# Patient Record
Sex: FEMALE | Race: WHITE | NOT HISPANIC OR LATINO | Employment: FULL TIME | URBAN - NONMETROPOLITAN AREA
[De-identification: names, ages, dates, MRNs, and addresses within clinical notes are randomized per-mention and may not be internally consistent; named-entity substitution may affect disease eponyms.]

---

## 2018-04-18 LAB — CRC RECOMMENDATION EXT: NORMAL

## 2018-11-16 ENCOUNTER — HISTORICAL (OUTPATIENT)
Dept: ADMINISTRATIVE | Facility: HOSPITAL | Age: 43
End: 2018-11-16

## 2020-03-13 ENCOUNTER — HISTORICAL (OUTPATIENT)
Dept: ADMINISTRATIVE | Facility: HOSPITAL | Age: 45
End: 2020-03-13

## 2020-03-23 ENCOUNTER — HISTORICAL (OUTPATIENT)
Dept: ADMINISTRATIVE | Facility: HOSPITAL | Age: 45
End: 2020-03-23

## 2020-03-23 LAB
ALBUMIN SERPL-MCNC: 4.2 G/DL (ref 3.8–4.8)
ALBUMIN/GLOB SERPL: 1.4 {RATIO} (ref 1.2–2.2)
ALP SERPL-CCNC: 85 IU/L (ref 39–117)
ALT SERPL-CCNC: 38 IU/L (ref 0–32)
AST SERPL-CCNC: 37 IU/L (ref 0–40)
BASOPHILS # BLD AUTO: 0.1 X10E3/UL (ref 0–0.2)
BASOPHILS NFR BLD AUTO: 1 %
BILIRUB SERPL-MCNC: 0.4 MG/DL (ref 0–1.2)
BUN SERPL-MCNC: 19 MG/DL (ref 6–24)
CALCIUM SERPL-MCNC: 9.9 MG/DL (ref 8.7–10.2)
CHLORIDE SERPL-SCNC: 101 MMOL/L (ref 96–106)
CO2 SERPL-SCNC: 19 MMOL/L (ref 20–29)
CREAT SERPL-MCNC: 0.68 MG/DL (ref 0.57–1)
CREAT/UREA NIT SERPL: 28 (ref 9–23)
EOSINOPHIL # BLD AUTO: 0.3 X10E3/UL (ref 0–0.4)
EOSINOPHIL NFR BLD AUTO: 1 %
ERYTHROCYTE [DISTWIDTH] IN BLOOD BY AUTOMATED COUNT: 13.2 % (ref 11.7–15.4)
GLOBULIN SER-MCNC: 3.1 G/DL (ref 1.5–4.5)
GLUCOSE SERPL-MCNC: 130 MG/DL (ref 65–99)
HBA1C MFR BLD: 7.1 % (ref 4.8–5.6)
HCT VFR BLD AUTO: 46.6 % (ref 34–46.6)
HGB BLD-MCNC: 15.2 G/DL (ref 11.1–15.9)
LYMPHOCYTES # BLD AUTO: 5.3 X10E3/UL (ref 0.7–3.1)
LYMPHOCYTES NFR BLD AUTO: 28 %
MAGNESIUM SERPL-MCNC: 2.3 MG/DL (ref 1.6–2.3)
MCH RBC QN AUTO: 27.9 PG (ref 26.6–33)
MCHC RBC AUTO-ENTMCNC: 32.6 G/DL (ref 31.5–35.7)
MCV RBC AUTO: 86 FL (ref 79–97)
MONOCYTES # BLD AUTO: 1.7 X10E3/UL (ref 0.1–0.9)
MONOCYTES NFR BLD AUTO: 9 %
NEUTROPHILS # BLD AUTO: 11.5 X10E3/UL (ref 1.4–7)
NEUTROPHILS NFR BLD AUTO: 61 %
PLATELET # BLD AUTO: 362 X10E3/UL (ref 150–450)
POTASSIUM SERPL-SCNC: 4.6 MMOL/L (ref 3.5–5.2)
PROT SERPL-MCNC: 7.3 G/DL (ref 6–8.5)
RBC # BLD AUTO: 5.45 X10(6)/MCL (ref 3.77–5.28)
SODIUM SERPL-SCNC: 135 MMOL/L (ref 134–144)
TSH SERPL-ACNC: 2.39 MIU/ML (ref 0.45–4.5)
WBC # SPEC AUTO: 18.9 X10E3/UL (ref 3.4–10.8)

## 2020-04-10 ENCOUNTER — HISTORICAL (OUTPATIENT)
Dept: ADMINISTRATIVE | Facility: HOSPITAL | Age: 45
End: 2020-04-10

## 2020-04-14 ENCOUNTER — HISTORICAL (OUTPATIENT)
Dept: ADMINISTRATIVE | Facility: HOSPITAL | Age: 45
End: 2020-04-14

## 2020-09-24 ENCOUNTER — HISTORICAL (OUTPATIENT)
Dept: ADMINISTRATIVE | Facility: HOSPITAL | Age: 45
End: 2020-09-24

## 2020-09-24 LAB
ALBUMIN SERPL-MCNC: 4.2 G/DL (ref 3.8–4.8)
ALBUMIN/GLOB SERPL: 1.3 {RATIO} (ref 1.2–2.2)
ALP SERPL-CCNC: 101 IU/L (ref 39–117)
ALT SERPL-CCNC: 34 IU/L (ref 0–32)
AST SERPL-CCNC: 52 IU/L (ref 0–40)
BASOPHILS # BLD AUTO: 0.1 X10E3/UL (ref 0–0.2)
BASOPHILS NFR BLD AUTO: 1 %
BILIRUB SERPL-MCNC: 0.3 MG/DL (ref 0–1.2)
BUN SERPL-MCNC: 11 MG/DL (ref 6–24)
CALCIUM SERPL-MCNC: 9.4 MG/DL (ref 8.7–10.2)
CHLORIDE SERPL-SCNC: 105 MMOL/L (ref 96–106)
CHOLEST SERPL-MCNC: 145 MG/DL (ref 100–199)
CHOLEST/HDLC SERPL: 3.3 RATIO (ref 0–4.4)
CO2 SERPL-SCNC: 18 MMOL/L (ref 20–29)
CREAT SERPL-MCNC: 0.6 MG/DL (ref 0.57–1)
CREAT/UREA NIT SERPL: 18 (ref 9–23)
DEPRECATED CALCIDIOL+CALCIFEROL SERPL-MC: 48.6 NG/ML (ref 30–100)
EOSINOPHIL # BLD AUTO: 0.6 X10E3/UL (ref 0–0.4)
EOSINOPHIL NFR BLD AUTO: 5 %
ERYTHROCYTE [DISTWIDTH] IN BLOOD BY AUTOMATED COUNT: 13.2 % (ref 11.7–15.4)
GLOBULIN SER-MCNC: 3.2 G/DL (ref 1.5–4.5)
GLUCOSE SERPL-MCNC: 109 MG/DL (ref 65–99)
HBA1C MFR BLD: 6.7 % (ref 4.8–5.6)
HCT VFR BLD AUTO: 43.1 % (ref 34–46.6)
HDLC SERPL-MCNC: 44 MG/DL
HGB BLD-MCNC: 13.6 G/DL (ref 11.1–15.9)
LDLC SERPL CALC-MCNC: 75 MG/DL (ref 0–99)
LYMPHOCYTES # BLD AUTO: 2.6 X10E3/UL (ref 0.7–3.1)
LYMPHOCYTES NFR BLD AUTO: 23 %
MCH RBC QN AUTO: 27.3 PG (ref 26.6–33)
MCHC RBC AUTO-ENTMCNC: 31.6 G/DL (ref 31.5–35.7)
MCV RBC AUTO: 86 FL (ref 79–97)
MONOCYTES # BLD AUTO: 1 X10E3/UL (ref 0.1–0.9)
MONOCYTES NFR BLD AUTO: 9 %
NEUTROPHILS # BLD AUTO: 7.1 X10E3/UL (ref 1.4–7)
NEUTROPHILS NFR BLD AUTO: 62 %
PLATELET # BLD AUTO: 415 X10E3/UL (ref 150–450)
POTASSIUM SERPL-SCNC: 4.5 MMOL/L (ref 3.5–5.2)
PROT SERPL-MCNC: 7.4 G/DL (ref 6–8.5)
RBC # BLD AUTO: 4.99 X10(6)/MCL (ref 3.77–5.28)
SODIUM SERPL-SCNC: 139 MMOL/L (ref 134–144)
TRIGL SERPL-MCNC: 147 MG/DL (ref 0–149)
TSH SERPL-ACNC: 1.59 MIU/ML (ref 0.45–4.5)
VLDLC SERPL CALC-MCNC: 26 MG/DL (ref 5–40)
WBC # SPEC AUTO: 11.5 X10E3/UL (ref 3.4–10.8)

## 2020-12-02 LAB — RAPID GROUP A STREP (OHS): POSITIVE

## 2021-03-04 LAB
LEFT EYE DM RETINOPATHY: NEGATIVE
RIGHT EYE DM RETINOPATHY: NEGATIVE

## 2021-07-12 ENCOUNTER — HISTORICAL (OUTPATIENT)
Dept: ADMINISTRATIVE | Facility: HOSPITAL | Age: 46
End: 2021-07-12

## 2021-07-12 LAB
ALBUMIN SERPL-MCNC: 3.9 G/DL (ref 3.8–4.8)
ALBUMIN/GLOB SERPL: 1.2 {RATIO} (ref 1.2–2.2)
ALP SERPL-CCNC: 95 IU/L (ref 48–121)
ALT SERPL-CCNC: 29 IU/L (ref 0–32)
AST SERPL-CCNC: 31 IU/L (ref 0–40)
BASOPHILS # BLD AUTO: 0.1 X10E3/UL (ref 0–0.2)
BASOPHILS NFR BLD AUTO: 1 %
BILIRUB SERPL-MCNC: <0.2 MG/DL (ref 0–1.2)
BUN SERPL-MCNC: 16 MG/DL (ref 6–24)
CALCIUM SERPL-MCNC: 9.5 MG/DL (ref 8.7–10.2)
CHLORIDE SERPL-SCNC: 100 MMOL/L (ref 96–106)
CHOLEST SERPL-MCNC: 161 MG/DL (ref 100–199)
CHOLEST/HDLC SERPL: 3.7 RATIO (ref 0–4.4)
CO2 SERPL-SCNC: 20 MMOL/L (ref 20–29)
CREAT SERPL-MCNC: 0.6 MG/DL (ref 0.57–1)
CREAT/UREA NIT SERPL: 27 (ref 9–23)
DEPRECATED CALCIDIOL+CALCIFEROL SERPL-MC: 57.9 NG/ML (ref 30–100)
EOSINOPHIL # BLD AUTO: 0.7 X10E3/UL (ref 0–0.4)
EOSINOPHIL NFR BLD AUTO: 5 %
ERYTHROCYTE [DISTWIDTH] IN BLOOD BY AUTOMATED COUNT: 13.5 % (ref 11.7–15.4)
GLOBULIN SER-MCNC: 3.3 G/DL (ref 1.5–4.5)
GLUCOSE SERPL-MCNC: 133 MG/DL (ref 65–99)
HBA1C MFR BLD: 6.3 % (ref 4.8–5.6)
HCT VFR BLD AUTO: 43 % (ref 34–46.6)
HDLC SERPL-MCNC: 44 MG/DL
HGB BLD-MCNC: 13.8 G/DL (ref 11.1–15.9)
LDLC SERPL CALC-MCNC: 80 MG/DL (ref 0–99)
LYMPHOCYTES # BLD AUTO: 3.6 X10E3/UL (ref 0.7–3.1)
LYMPHOCYTES NFR BLD AUTO: 25 %
MCH RBC QN AUTO: 29.6 PG (ref 26.6–33)
MCHC RBC AUTO-ENTMCNC: 32.1 G/DL (ref 31.5–35.7)
MCV RBC AUTO: 92 FL (ref 79–97)
MONOCYTES # BLD AUTO: 1.1 X10E3/UL (ref 0.1–0.9)
MONOCYTES NFR BLD AUTO: 7 %
NEUTROPHILS # BLD AUTO: 9 X10E3/UL (ref 1.4–7)
NEUTROPHILS NFR BLD AUTO: 62 %
PLATELET # BLD AUTO: 377 X10E3/UL (ref 150–450)
POTASSIUM SERPL-SCNC: 4.6 MMOL/L (ref 3.5–5.2)
PROT SERPL-MCNC: 7.2 G/DL (ref 6–8.5)
RBC # BLD AUTO: 4.66 X10(6)/MCL (ref 3.77–5.28)
SODIUM SERPL-SCNC: 139 MMOL/L (ref 134–144)
TRIGL SERPL-MCNC: 224 MG/DL (ref 0–149)
TSH SERPL-ACNC: 2.43 MIU/ML (ref 0.45–4.5)
VLDLC SERPL CALC-MCNC: 37 MG/DL (ref 5–40)
WBC # SPEC AUTO: 14.5 X10E3/UL (ref 3.4–10.8)

## 2021-08-05 LAB
BILIRUB SERPL-MCNC: NEGATIVE MG/DL
BLOOD URINE, POC: NORMAL
CLARITY, POC UA: CLEAR
COLOR, POC UA: YELLOW
GLUCOSE UR QL STRIP: NEGATIVE
KETONES UR QL STRIP: NEGATIVE
LEUKOCYTE EST, POC UA: NORMAL
NITRITE, POC UA: NEGATIVE
PH, POC UA: 5.5
PROTEIN, POC: NEGATIVE
SPECIFIC GRAVITY, POC UA: 1.02
UROBILINOGEN, POC UA: NORMAL

## 2021-08-16 LAB — PAP RECOMMENDATION EXT: NORMAL

## 2021-11-06 ENCOUNTER — HISTORICAL (OUTPATIENT)
Dept: ADMINISTRATIVE | Facility: HOSPITAL | Age: 46
End: 2021-11-06

## 2021-11-30 ENCOUNTER — HISTORICAL (OUTPATIENT)
Dept: ADMINISTRATIVE | Facility: HOSPITAL | Age: 46
End: 2021-11-30

## 2021-11-30 LAB
ALBUMIN SERPL-MCNC: 4.1 G/DL (ref 3.8–4.8)
ALBUMIN/GLOB SERPL: 1.2 {RATIO} (ref 1.2–2.2)
ALP SERPL-CCNC: 94 IU/L (ref 44–121)
ALT SERPL-CCNC: 53 IU/L (ref 0–32)
AST SERPL-CCNC: 73 IU/L (ref 0–40)
BASOPHILS # BLD AUTO: 0.1 X10E3/UL (ref 0–0.2)
BASOPHILS NFR BLD AUTO: 1 %
BILIRUB SERPL-MCNC: 0.3 MG/DL (ref 0–1.2)
BUN SERPL-MCNC: 20 MG/DL (ref 6–24)
CALCIUM SERPL-MCNC: 9.7 MG/DL (ref 8.7–10.2)
CHLORIDE SERPL-SCNC: 102 MMOL/L (ref 96–106)
CHOLEST SERPL-MCNC: 157 MG/DL (ref 100–199)
CHOLEST/HDLC SERPL: 3.6 RATIO (ref 0–4.4)
CO2 SERPL-SCNC: 18 MMOL/L (ref 20–29)
CREAT SERPL-MCNC: 0.57 MG/DL (ref 0.57–1)
CREAT/UREA NIT SERPL: 35 (ref 9–23)
DEPRECATED CALCIDIOL+CALCIFEROL SERPL-MC: 93.6 NG/ML (ref 30–100)
EOSINOPHIL # BLD AUTO: 0.4 X10E3/UL (ref 0–0.4)
EOSINOPHIL NFR BLD AUTO: 4 %
ERYTHROCYTE [DISTWIDTH] IN BLOOD BY AUTOMATED COUNT: 13 % (ref 11.7–15.4)
GLOBULIN SER-MCNC: 3.3 G/DL (ref 1.5–4.5)
GLUCOSE SERPL-MCNC: 115 MG/DL (ref 65–99)
HBA1C MFR BLD: 6.6 % (ref 4.8–5.6)
HCT VFR BLD AUTO: 41.1 % (ref 34–46.6)
HDLC SERPL-MCNC: 44 MG/DL
HGB BLD-MCNC: 13.9 G/DL (ref 11.1–15.9)
LDLC SERPL CALC-MCNC: 73 MG/DL (ref 0–99)
LYMPHOCYTES # BLD AUTO: 3.2 X10E3/UL (ref 0.7–3.1)
LYMPHOCYTES NFR BLD AUTO: 27 %
MCH RBC QN AUTO: 29.8 PG (ref 26.6–33)
MCHC RBC AUTO-ENTMCNC: 33.8 G/DL (ref 31.5–35.7)
MCV RBC AUTO: 88 FL (ref 79–97)
MONOCYTES # BLD AUTO: 0.9 X10E3/UL (ref 0.1–0.9)
MONOCYTES NFR BLD AUTO: 7 %
NEUTROPHILS # BLD AUTO: 7.3 X10E3/UL (ref 1.4–7)
NEUTROPHILS NFR BLD AUTO: 61 %
PLATELET # BLD AUTO: 357 X10E3/UL (ref 150–450)
POTASSIUM SERPL-SCNC: 4.6 MMOL/L (ref 3.5–5.2)
PROT SERPL-MCNC: 7.4 G/DL (ref 6–8.5)
RBC # BLD AUTO: 4.67 X10(6)/MCL (ref 3.77–5.28)
SODIUM SERPL-SCNC: 141 MMOL/L (ref 134–144)
TRIGL SERPL-MCNC: 244 MG/DL (ref 0–149)
TSH SERPL-ACNC: 2.53 MIU/ML (ref 0.45–4.5)
VLDLC SERPL CALC-MCNC: 40 MG/DL (ref 5–40)
WBC # SPEC AUTO: 11.9 X10E3/UL (ref 3.4–10.8)

## 2021-12-09 ENCOUNTER — HISTORICAL (OUTPATIENT)
Dept: ADMINISTRATIVE | Facility: HOSPITAL | Age: 46
End: 2021-12-09

## 2021-12-14 ENCOUNTER — HISTORICAL (OUTPATIENT)
Dept: ADMINISTRATIVE | Facility: HOSPITAL | Age: 46
End: 2021-12-14

## 2021-12-15 ENCOUNTER — HISTORICAL (OUTPATIENT)
Dept: ADMINISTRATIVE | Facility: HOSPITAL | Age: 46
End: 2021-12-15

## 2022-02-04 ENCOUNTER — HISTORICAL (OUTPATIENT)
Dept: CARDIOLOGY | Facility: HOSPITAL | Age: 47
End: 2022-02-04

## 2022-02-04 LAB — CBG: 139 (ref 70–115)

## 2022-04-11 ENCOUNTER — HISTORICAL (OUTPATIENT)
Dept: ADMINISTRATIVE | Facility: HOSPITAL | Age: 47
End: 2022-04-11

## 2022-04-28 VITALS
BODY MASS INDEX: 40.81 KG/M2 | SYSTOLIC BLOOD PRESSURE: 124 MMHG | DIASTOLIC BLOOD PRESSURE: 70 MMHG | WEIGHT: 260 LBS | HEIGHT: 67 IN | OXYGEN SATURATION: 97 %

## 2022-05-03 LAB
ALBUMIN/GLOB SERPL: 1.2 {RATIO} (ref 1.2–2.2)
CHOLEST SERPL-MCNC: 157 MG/DL (ref 100–199)
CHOLEST/HDLC SERPL: 3.9 {RATIO} (ref 0–4.4)
HBA1C MFR BLD: 7.2 % (ref 4.8–5.6)
HDLC SERPL-MCNC: 40 MG/DL
LDLC SERPL CALC-MCNC: 78 MG/DL (ref 0–99)
TRIGL SERPL-MCNC: 237 MG/DL (ref 0–149)
VLDLC SERPL CALC-MCNC: 39 MG/DL (ref 5–40)

## 2022-05-16 ENCOUNTER — HISTORICAL (OUTPATIENT)
Dept: ADMINISTRATIVE | Facility: HOSPITAL | Age: 47
End: 2022-05-16

## 2022-09-22 ENCOUNTER — HISTORICAL (OUTPATIENT)
Dept: ADMINISTRATIVE | Facility: HOSPITAL | Age: 47
End: 2022-09-22
Payer: COMMERCIAL

## 2022-10-12 ENCOUNTER — DOCUMENTATION ONLY (OUTPATIENT)
Dept: PRIMARY CARE CLINIC | Facility: CLINIC | Age: 47
End: 2022-10-12
Payer: COMMERCIAL

## 2022-12-07 LAB
HUMAN PAPILLOMAVIRUS (HPV): NORMAL
PAP RECOMMENDATION EXT: NORMAL
PAP SMEAR: NORMAL

## 2023-01-30 ENCOUNTER — OFFICE VISIT (OUTPATIENT)
Dept: FAMILY MEDICINE | Facility: CLINIC | Age: 48
End: 2023-01-30
Payer: COMMERCIAL

## 2023-01-30 ENCOUNTER — TELEPHONE (OUTPATIENT)
Dept: FAMILY MEDICINE | Facility: CLINIC | Age: 48
End: 2023-01-30

## 2023-01-30 VITALS
DIASTOLIC BLOOD PRESSURE: 68 MMHG | WEIGHT: 245 LBS | HEIGHT: 67 IN | OXYGEN SATURATION: 98 % | BODY MASS INDEX: 38.45 KG/M2 | SYSTOLIC BLOOD PRESSURE: 118 MMHG | TEMPERATURE: 98 F | RESPIRATION RATE: 20 BRPM | HEART RATE: 82 BPM

## 2023-01-30 DIAGNOSIS — R92.8 ABNORMAL MAMMOGRAM OF BOTH BREASTS: ICD-10-CM

## 2023-01-30 DIAGNOSIS — Z23 IMMUNIZATION DUE: ICD-10-CM

## 2023-01-30 DIAGNOSIS — E11.9 DIABETES MELLITUS WITHOUT COMPLICATION: Primary | ICD-10-CM

## 2023-01-30 DIAGNOSIS — L70.0 ACNE VULGARIS: ICD-10-CM

## 2023-01-30 DIAGNOSIS — Z79.899 ENCOUNTER FOR LONG-TERM (CURRENT) USE OF MEDICATIONS: ICD-10-CM

## 2023-01-30 PROBLEM — Z12.39 ENCOUNTER FOR SCREENING BREAST EXAMINATION: Status: ACTIVE | Noted: 2023-01-30

## 2023-01-30 PROCEDURE — 3078F PR MOST RECENT DIASTOLIC BLOOD PRESSURE < 80 MM HG: ICD-10-PCS | Mod: CPTII,,, | Performed by: NURSE PRACTITIONER

## 2023-01-30 PROCEDURE — 1160F RVW MEDS BY RX/DR IN RCRD: CPT | Mod: CPTII,,, | Performed by: NURSE PRACTITIONER

## 2023-01-30 PROCEDURE — 4010F ACE/ARB THERAPY RXD/TAKEN: CPT | Mod: CPTII,,, | Performed by: NURSE PRACTITIONER

## 2023-01-30 PROCEDURE — 1160F PR REVIEW ALL MEDS BY PRESCRIBER/CLIN PHARMACIST DOCUMENTED: ICD-10-PCS | Mod: CPTII,,, | Performed by: NURSE PRACTITIONER

## 2023-01-30 PROCEDURE — 4010F PR ACE/ARB THEARPY RXD/TAKEN: ICD-10-PCS | Mod: CPTII,,, | Performed by: NURSE PRACTITIONER

## 2023-01-30 PROCEDURE — 3078F DIAST BP <80 MM HG: CPT | Mod: CPTII,,, | Performed by: NURSE PRACTITIONER

## 2023-01-30 PROCEDURE — 90471 IMMUNIZATION ADMIN: CPT | Mod: ,,, | Performed by: NURSE PRACTITIONER

## 2023-01-30 PROCEDURE — 3074F SYST BP LT 130 MM HG: CPT | Mod: CPTII,,, | Performed by: NURSE PRACTITIONER

## 2023-01-30 PROCEDURE — 1159F PR MEDICATION LIST DOCUMENTED IN MEDICAL RECORD: ICD-10-PCS | Mod: CPTII,,, | Performed by: NURSE PRACTITIONER

## 2023-01-30 PROCEDURE — 3008F PR BODY MASS INDEX (BMI) DOCUMENTED: ICD-10-PCS | Mod: CPTII,,, | Performed by: NURSE PRACTITIONER

## 2023-01-30 PROCEDURE — 90471 TDAP VACCINE GREATER THAN OR EQUAL TO 7YO IM: ICD-10-PCS | Mod: ,,, | Performed by: NURSE PRACTITIONER

## 2023-01-30 PROCEDURE — 1159F MED LIST DOCD IN RCRD: CPT | Mod: CPTII,,, | Performed by: NURSE PRACTITIONER

## 2023-01-30 PROCEDURE — 3074F PR MOST RECENT SYSTOLIC BLOOD PRESSURE < 130 MM HG: ICD-10-PCS | Mod: CPTII,,, | Performed by: NURSE PRACTITIONER

## 2023-01-30 PROCEDURE — 90715 TDAP VACCINE GREATER THAN OR EQUAL TO 7YO IM: ICD-10-PCS | Mod: ,,, | Performed by: NURSE PRACTITIONER

## 2023-01-30 PROCEDURE — 99214 OFFICE O/P EST MOD 30 MIN: CPT | Mod: 25,,, | Performed by: NURSE PRACTITIONER

## 2023-01-30 PROCEDURE — 90715 TDAP VACCINE 7 YRS/> IM: CPT | Mod: ,,, | Performed by: NURSE PRACTITIONER

## 2023-01-30 PROCEDURE — 99214 PR OFFICE/OUTPT VISIT, EST, LEVL IV, 30-39 MIN: ICD-10-PCS | Mod: 25,,, | Performed by: NURSE PRACTITIONER

## 2023-01-30 PROCEDURE — 3008F BODY MASS INDEX DOCD: CPT | Mod: CPTII,,, | Performed by: NURSE PRACTITIONER

## 2023-01-30 RX ORDER — SUVOREXANT 20 MG/1
1 TABLET, FILM COATED ORAL NIGHTLY
COMMUNITY
Start: 2023-01-26

## 2023-01-30 RX ORDER — CYANOCOBALAMIN 1000 UG/ML
1000 INJECTION, SOLUTION INTRAMUSCULAR; SUBCUTANEOUS
COMMUNITY
Start: 2022-02-04 | End: 2023-04-03 | Stop reason: SDUPTHER

## 2023-01-30 RX ORDER — ALBUTEROL SULFATE 90 UG/1
2 AEROSOL, METERED RESPIRATORY (INHALATION) 4 TIMES DAILY
COMMUNITY
Start: 2022-12-28 | End: 2023-03-28 | Stop reason: SDUPTHER

## 2023-01-30 RX ORDER — FLUTICASONE PROPIONATE 50 MCG
1 SPRAY, SUSPENSION (ML) NASAL 2 TIMES DAILY
COMMUNITY
Start: 2022-11-16 | End: 2023-03-28 | Stop reason: SDUPTHER

## 2023-01-30 RX ORDER — DOXEPIN HYDROCHLORIDE 100 MG/1
100 CAPSULE ORAL
COMMUNITY
Start: 2022-09-09

## 2023-01-30 RX ORDER — METFORMIN HYDROCHLORIDE 500 MG/1
2000 TABLET, EXTENDED RELEASE ORAL
COMMUNITY
Start: 2022-11-16 | End: 2023-01-30 | Stop reason: SDUPTHER

## 2023-01-30 RX ORDER — BUPROPION HYDROCHLORIDE 300 MG/1
300 TABLET ORAL EVERY MORNING
COMMUNITY
Start: 2023-01-26

## 2023-01-30 RX ORDER — ASPIRIN 325 MG
50000 TABLET, DELAYED RELEASE (ENTERIC COATED) ORAL
COMMUNITY
Start: 2022-02-04 | End: 2023-04-03 | Stop reason: SDUPTHER

## 2023-01-30 RX ORDER — TRETINOIN 0.5 MG/G
CREAM TOPICAL NIGHTLY
Qty: 45 G | Refills: 1 | Status: SHIPPED | OUTPATIENT
Start: 2023-01-30 | End: 2023-03-28 | Stop reason: SDUPTHER

## 2023-01-30 RX ORDER — IBUPROFEN AND FAMOTIDINE 800; 26.6 MG/1; MG/1
1 TABLET, COATED ORAL
COMMUNITY
Start: 2022-12-16 | End: 2024-01-23 | Stop reason: SDUPTHER

## 2023-01-30 RX ORDER — TIRZEPATIDE 7.5 MG/.5ML
7.5 INJECTION, SOLUTION SUBCUTANEOUS WEEKLY
Qty: 4 PEN | Refills: 2 | Status: SHIPPED | OUTPATIENT
Start: 2023-01-30 | End: 2023-03-28 | Stop reason: SDUPTHER

## 2023-01-30 RX ORDER — LOSARTAN POTASSIUM 50 MG/1
50 TABLET ORAL NIGHTLY
COMMUNITY
Start: 2022-11-16 | End: 2023-03-28 | Stop reason: SDUPTHER

## 2023-01-30 RX ORDER — FLUTICASONE FUROATE, UMECLIDINIUM BROMIDE AND VILANTEROL TRIFENATATE 200; 62.5; 25 UG/1; UG/1; UG/1
1 POWDER RESPIRATORY (INHALATION) DAILY
COMMUNITY
Start: 2022-11-16 | End: 2023-06-27 | Stop reason: SDUPTHER

## 2023-01-30 RX ORDER — CETIRIZINE HYDROCHLORIDE 10 MG/1
10 TABLET ORAL NIGHTLY
COMMUNITY
Start: 2022-10-19 | End: 2023-03-28 | Stop reason: SDUPTHER

## 2023-01-30 RX ORDER — METOPROLOL SUCCINATE 50 MG/1
TABLET, EXTENDED RELEASE ORAL
COMMUNITY
Start: 2022-08-17 | End: 2023-06-27 | Stop reason: ALTCHOICE

## 2023-01-30 RX ORDER — MONTELUKAST SODIUM 10 MG/1
10 TABLET ORAL NIGHTLY
COMMUNITY
Start: 2022-12-22 | End: 2023-03-23

## 2023-01-30 RX ORDER — AZELASTINE 1 MG/ML
1 SPRAY, METERED NASAL 2 TIMES DAILY
COMMUNITY
Start: 2022-12-09 | End: 2023-03-28 | Stop reason: SDUPTHER

## 2023-01-30 RX ORDER — OXCARBAZEPINE 300 MG/1
300 TABLET, FILM COATED ORAL 2 TIMES DAILY
COMMUNITY
Start: 2023-01-26

## 2023-01-30 RX ORDER — LEVOCETIRIZINE DIHYDROCHLORIDE 5 MG/1
5 TABLET, FILM COATED ORAL
COMMUNITY
End: 2023-03-28 | Stop reason: ALTCHOICE

## 2023-01-30 RX ORDER — METFORMIN HYDROCHLORIDE 500 MG/1
1000 TABLET, EXTENDED RELEASE ORAL DAILY
Qty: 180 TABLET | Refills: 0 | Status: SHIPPED | OUTPATIENT
Start: 2023-01-30 | End: 2023-09-25

## 2023-01-30 RX ORDER — CLINDAMYCIN PHOSPHATE 11.9 MG/ML
SOLUTION TOPICAL NIGHTLY
COMMUNITY
Start: 2022-10-19

## 2023-01-30 RX ORDER — TIRZEPATIDE 7.5 MG/.5ML
7.5 INJECTION, SOLUTION SUBCUTANEOUS WEEKLY
COMMUNITY
Start: 2022-12-28 | End: 2023-01-30 | Stop reason: SDUPTHER

## 2023-01-30 RX ORDER — ROSUVASTATIN CALCIUM 10 MG/1
10 TABLET, COATED ORAL NIGHTLY
COMMUNITY
Start: 2022-12-16 | End: 2024-01-23 | Stop reason: SDUPTHER

## 2023-01-30 RX ORDER — DEXTROAMPHETAMINE SACCHARATE, AMPHETAMINE ASPARTATE, DEXTROAMPHETAMINE SULFATE AND AMPHETAMINE SULFATE 7.5; 7.5; 7.5; 7.5 MG/1; MG/1; MG/1; MG/1
1 TABLET ORAL 2 TIMES DAILY
COMMUNITY
Start: 2022-12-27 | End: 2024-01-23 | Stop reason: SDUPTHER

## 2023-01-30 RX ORDER — ALPRAZOLAM 2 MG/1
2 TABLET ORAL 2 TIMES DAILY
COMMUNITY
Start: 2023-01-26

## 2023-01-30 RX ORDER — DEXLANSOPRAZOLE 60 MG/1
1 CAPSULE, DELAYED RELEASE ORAL
COMMUNITY
Start: 2023-01-26 | End: 2023-06-27

## 2023-01-30 NOTE — TELEPHONE ENCOUNTER
----- Message from Cary Peng sent at 1/30/2023  2:45 PM CST -----  Patient needs a new script for [facility] Alex[facility] sent to [address]

## 2023-01-30 NOTE — PROGRESS NOTES
"Subjective:       Patient ID: Sagrario Waller is a 47 y.o. female.    Chief Complaint: Follow-up (On diabetes)    The patient returns for a follow-up with her medication she is taking the module Rocephin 0.5 and tolerating it very well however she is having loose stool every time she eats and will decrease the metformin from 5533-1569 once a day.  Continue to check blood sugars running extremely well-controlled would like to get a continuous glucose monitor but not taken insulin recommended trying at hospital and paying with a coupon for 70 dollars.  Needs mammogram bilateral with left ultrasound at [facility] imaging order given.    Review of Systems   Constitutional:  Negative for appetite change.   HENT:  Negative for nasal congestion.    Eyes: Negative.    Respiratory: Negative.     Cardiovascular:  Negative for chest pain, palpitations and leg swelling.   Endocrine: Negative for cold intolerance, heat intolerance and polyuria.   Genitourinary:  Negative for bladder incontinence, difficulty urinating, flank pain and frequency.   Allergic/Immunologic: Negative for environmental allergies and food allergies.   Neurological:  Negative for vertigo, tremors, seizures, syncope, light-headedness, headaches, coordination difficulties and coordination difficulties.   Hematological:  Negative for adenopathy. Does not bruise/bleed easily.   Psychiatric/Behavioral:  Negative for agitation, sleep disturbance and suicidal ideas.        Objective:      Vitals:    01/30/23 1317   BP: 118/68   Pulse: 82   Resp: 20   Temp: 98.2 °F (36.8 °C)   SpO2: 98%   Weight: 111.1 kg (245 lb)   Height: 5' 7" (1.702 m)       Physical Exam  Constitutional:       Appearance: Normal appearance.   HENT:      Head: Normocephalic.      Mouth/Throat:      Mouth: Mucous membranes are dry.   Cardiovascular:      Rate and Rhythm: Normal rate and regular rhythm.      Pulses: Normal pulses.   Pulmonary:      Effort: Pulmonary effort is normal.      Breath " sounds: Normal breath sounds.   Abdominal:      General: Abdomen is flat.      Palpations: Abdomen is soft.   Musculoskeletal:      Cervical back: Normal range of motion.   Skin:     General: Skin is warm and dry.   Neurological:      General: No focal deficit present.      Mental Status: She is alert.   Psychiatric:         Mood and Affect: Mood normal.       Assessment/Plan:     1. Diabetes mellitus without complication  Overview:  Lost 15# since starting Mounjaro, continue current    Assessment & Plan:  Tolerating current medication without adverse effects. Continue current medications.     Orders:  -     metFORMIN (GLUCOPHAGE-XR) 500 MG ER 24hr tablet; Take 2 tablets (1,000 mg total) by mouth once daily.  Dispense: 180 tablet; Refill: 0  -     tirzepatide (MOUNJARO) 7.5 mg/0.5 mL PnIj; Inject 7.5 mg into the skin once a week.  Dispense: 4 pen; Refill: 2    2. Encounter for long-term (current) use of medications  Assessment & Plan:  The current medical regimen is effective;  continue present plan and medications.      3. Abnormal mammogram of both breasts  -     Mammo Digital Diagnostic Bilat with Lalit; Future; Expected date: 01/31/2023  -     US Breast Left Limited; Future; Expected date: 01/30/2023    4. Acne vulgaris  Assessment & Plan:  Order but no pregnancy due to possible risk to fetus, add moistening cream as needed.       5. Immunization due  Assessment & Plan:  TdaP due         Follow up in about 4 weeks (around 2/27/2023).          Discussed the diagnosis, prognosis, plan of care, chronic nature of and indications for, risks and benefits of treatment for conditions.  Continue all medications as prescribed unless otherwise noted.   Call if patient develops other symptoms or if not better in 2-3 days and sooner if worse. Emphasized the importance of compliance with all recommendations, including medication use and timely follow up. Instructed to return as noted be or sooner if patient develops any other  problems or if there are any other additional questions or concerns.

## 2023-01-30 NOTE — PATIENT INSTRUCTIONS
The patient is doing very well with the module row tolerating it the 7.5 without problems we will continue this for the March but recheck hemoglobin A1c insulin CMP  to check progress.  Knee diagnostic mammogram at [facility] with left breast ultrasound for abnormal past lost 15 lb feeling amazing we will start medication for acne but discussed potential of pregnancy harm but not currently active for some time and not a problem.

## 2023-01-31 DIAGNOSIS — E11.9 TYPE 2 DIABETES MELLITUS WITHOUT COMPLICATION, UNSPECIFIED WHETHER LONG TERM INSULIN USE: Primary | ICD-10-CM

## 2023-02-02 ENCOUNTER — DOCUMENTATION ONLY (OUTPATIENT)
Dept: ADMINISTRATIVE | Facility: HOSPITAL | Age: 48
End: 2023-02-02
Payer: COMMERCIAL

## 2023-02-14 ENCOUNTER — TELEPHONE (OUTPATIENT)
Dept: FAMILY MEDICINE | Facility: CLINIC | Age: 48
End: 2023-02-14
Payer: COMMERCIAL

## 2023-02-14 DIAGNOSIS — R39.9 UTI SYMPTOMS: Primary | ICD-10-CM

## 2023-02-14 NOTE — TELEPHONE ENCOUNTER
----- Message from Dereje Noble MA sent at 2/14/2023  3:00 PM CST -----  Regarding: Order  Pt has discolored urine and wanted to have it checked. She asked if an order can be done and sent to the hospital so she can go tomorrow morning

## 2023-02-15 ENCOUNTER — TELEPHONE (OUTPATIENT)
Dept: FAMILY MEDICINE | Facility: CLINIC | Age: 48
End: 2023-02-15
Payer: COMMERCIAL

## 2023-02-15 ENCOUNTER — LAB VISIT (OUTPATIENT)
Dept: LAB | Facility: HOSPITAL | Age: 48
End: 2023-02-15
Attending: NURSE PRACTITIONER
Payer: COMMERCIAL

## 2023-02-15 DIAGNOSIS — R39.9 UTI SYMPTOMS: ICD-10-CM

## 2023-02-15 LAB
APPEARANCE UR: CLEAR
BACTERIA #/AREA URNS AUTO: ABNORMAL /HPF
BILIRUB UR QL STRIP.AUTO: NEGATIVE MG/DL
COLOR UR AUTO: YELLOW
GLUCOSE UR QL STRIP.AUTO: NEGATIVE MG/DL
KETONES UR QL STRIP.AUTO: 40 MG/DL
LEUKOCYTE ESTERASE UR QL STRIP.AUTO: NEGATIVE UNIT/L
NITRITE UR QL STRIP.AUTO: NEGATIVE
PH UR STRIP.AUTO: 6 [PH]
PROT UR QL STRIP.AUTO: ABNORMAL MG/DL
RBC #/AREA URNS AUTO: ABNORMAL /HPF
RBC UR QL AUTO: ABNORMAL UNIT/L
SP GR UR STRIP.AUTO: <=1.005
SQUAMOUS #/AREA URNS AUTO: ABNORMAL /HPF
UROBILINOGEN UR STRIP-ACNC: 0.2 MG/DL
WBC #/AREA URNS AUTO: ABNORMAL /HPF

## 2023-02-15 PROCEDURE — 81001 URINALYSIS AUTO W/SCOPE: CPT

## 2023-02-15 NOTE — TELEPHONE ENCOUNTER
----- Message from Nkehci Oconnell sent at 2/15/2023  3:43 PM CST -----  Regarding: UTI lab result      She called to say she saw her labs on the portal!  She sees that she has some bacteria in her urine.  If you call out meds please call them out to [facility] in [place].  She is leaving to go out of [facility] and she can have it transferred to where ever she will be.      Please call her and let her know if/when you call some thing out.      [phone]

## 2023-02-15 NOTE — TELEPHONE ENCOUNTER
Per [facility]-awaiting culture before deciding on ABX. Any history of kidney stones? Increase water intake and can screen urine if wanted. After culture results, possible CT scan.     Pt notified. Pt states never had trouble with kidney stones.

## 2023-02-16 ENCOUNTER — PATIENT MESSAGE (OUTPATIENT)
Dept: ADMINISTRATIVE | Facility: HOSPITAL | Age: 48
End: 2023-02-16
Payer: COMMERCIAL

## 2023-03-14 LAB
LEFT EYE DM RETINOPATHY: NEGATIVE
RIGHT EYE DM RETINOPATHY: NEGATIVE

## 2023-03-16 ENCOUNTER — DOCUMENTATION ONLY (OUTPATIENT)
Dept: FAMILY MEDICINE | Facility: CLINIC | Age: 48
End: 2023-03-16
Payer: COMMERCIAL

## 2023-03-20 ENCOUNTER — TELEPHONE (OUTPATIENT)
Dept: FAMILY MEDICINE | Facility: CLINIC | Age: 48
End: 2023-03-20
Payer: COMMERCIAL

## 2023-03-20 NOTE — TELEPHONE ENCOUNTER
----- Message from Genesis Lopez DNP sent at 3/20/2023  6:14 AM CDT -----  Normal dilated eye exam. Please complete diabetes qa and set reminder to recheck next year.

## 2023-03-28 ENCOUNTER — TELEPHONE (OUTPATIENT)
Dept: FAMILY MEDICINE | Facility: CLINIC | Age: 48
End: 2023-03-28
Payer: COMMERCIAL

## 2023-03-28 ENCOUNTER — OFFICE VISIT (OUTPATIENT)
Dept: FAMILY MEDICINE | Facility: CLINIC | Age: 48
End: 2023-03-28
Payer: COMMERCIAL

## 2023-03-28 VITALS
WEIGHT: 227 LBS | TEMPERATURE: 97 F | BODY MASS INDEX: 35.63 KG/M2 | RESPIRATION RATE: 18 BRPM | SYSTOLIC BLOOD PRESSURE: 124 MMHG | HEIGHT: 67 IN | OXYGEN SATURATION: 99 % | HEART RATE: 83 BPM | DIASTOLIC BLOOD PRESSURE: 72 MMHG

## 2023-03-28 DIAGNOSIS — J30.0 VASOMOTOR RHINITIS: ICD-10-CM

## 2023-03-28 DIAGNOSIS — I10 PRIMARY HYPERTENSION: ICD-10-CM

## 2023-03-28 DIAGNOSIS — T78.40XD ALLERGY, SUBSEQUENT ENCOUNTER: ICD-10-CM

## 2023-03-28 DIAGNOSIS — Z13.9 SCREENING DUE: ICD-10-CM

## 2023-03-28 DIAGNOSIS — E11.9 DIABETES MELLITUS WITHOUT COMPLICATION: Primary | ICD-10-CM

## 2023-03-28 DIAGNOSIS — J45.20 MILD INTERMITTENT ASTHMA WITHOUT COMPLICATION: ICD-10-CM

## 2023-03-28 DIAGNOSIS — K21.9 GASTROESOPHAGEAL REFLUX DISEASE WITHOUT ESOPHAGITIS: ICD-10-CM

## 2023-03-28 DIAGNOSIS — Z79.899 ENCOUNTER FOR LONG-TERM (CURRENT) USE OF MEDICATIONS: ICD-10-CM

## 2023-03-28 DIAGNOSIS — J45.909 ASTHMA, UNSPECIFIED ASTHMA SEVERITY, UNSPECIFIED WHETHER COMPLICATED, UNSPECIFIED WHETHER PERSISTENT: ICD-10-CM

## 2023-03-28 DIAGNOSIS — E78.5 HYPERLIPIDEMIA, UNSPECIFIED HYPERLIPIDEMIA TYPE: ICD-10-CM

## 2023-03-28 DIAGNOSIS — Z87.442 HISTORY OF KIDNEY STONES: ICD-10-CM

## 2023-03-28 DIAGNOSIS — J02.9 PHARYNGITIS, UNSPECIFIED ETIOLOGY: ICD-10-CM

## 2023-03-28 DIAGNOSIS — E78.41 ELEVATED LIPOPROTEIN(A): ICD-10-CM

## 2023-03-28 DIAGNOSIS — L70.0 ACNE VULGARIS: ICD-10-CM

## 2023-03-28 LAB — GLUCOSE SERPL-MCNC: 120 MG/DL (ref 70–110)

## 2023-03-28 PROCEDURE — 3074F SYST BP LT 130 MM HG: CPT | Mod: CPTII,,, | Performed by: NURSE PRACTITIONER

## 2023-03-28 PROCEDURE — 3008F BODY MASS INDEX DOCD: CPT | Mod: CPTII,,, | Performed by: NURSE PRACTITIONER

## 2023-03-28 PROCEDURE — 3008F PR BODY MASS INDEX (BMI) DOCUMENTED: ICD-10-PCS | Mod: CPTII,,, | Performed by: NURSE PRACTITIONER

## 2023-03-28 PROCEDURE — 82962 GLUCOSE BLOOD TEST: CPT | Mod: ,,, | Performed by: NURSE PRACTITIONER

## 2023-03-28 PROCEDURE — 3074F PR MOST RECENT SYSTOLIC BLOOD PRESSURE < 130 MM HG: ICD-10-PCS | Mod: CPTII,,, | Performed by: NURSE PRACTITIONER

## 2023-03-28 PROCEDURE — 3078F DIAST BP <80 MM HG: CPT | Mod: CPTII,,, | Performed by: NURSE PRACTITIONER

## 2023-03-28 PROCEDURE — 3078F PR MOST RECENT DIASTOLIC BLOOD PRESSURE < 80 MM HG: ICD-10-PCS | Mod: CPTII,,, | Performed by: NURSE PRACTITIONER

## 2023-03-28 PROCEDURE — 4010F ACE/ARB THERAPY RXD/TAKEN: CPT | Mod: CPTII,,, | Performed by: NURSE PRACTITIONER

## 2023-03-28 PROCEDURE — 1159F MED LIST DOCD IN RCRD: CPT | Mod: CPTII,,, | Performed by: NURSE PRACTITIONER

## 2023-03-28 PROCEDURE — 82962 POCT GLUCOSE, HAND-HELD DEVICE: ICD-10-PCS | Mod: ,,, | Performed by: NURSE PRACTITIONER

## 2023-03-28 PROCEDURE — 1159F PR MEDICATION LIST DOCUMENTED IN MEDICAL RECORD: ICD-10-PCS | Mod: CPTII,,, | Performed by: NURSE PRACTITIONER

## 2023-03-28 PROCEDURE — 4010F PR ACE/ARB THEARPY RXD/TAKEN: ICD-10-PCS | Mod: CPTII,,, | Performed by: NURSE PRACTITIONER

## 2023-03-28 PROCEDURE — 99213 PR OFFICE/OUTPT VISIT, EST, LEVL III, 20-29 MIN: ICD-10-PCS | Mod: ,,, | Performed by: NURSE PRACTITIONER

## 2023-03-28 PROCEDURE — 99213 OFFICE O/P EST LOW 20 MIN: CPT | Mod: ,,, | Performed by: NURSE PRACTITIONER

## 2023-03-28 RX ORDER — TRETINOIN 0.5 MG/G
CREAM TOPICAL NIGHTLY
Qty: 45 G | Refills: 1 | Status: SHIPPED | OUTPATIENT
Start: 2023-03-28 | End: 2023-04-03 | Stop reason: SDUPTHER

## 2023-03-28 RX ORDER — ALBUTEROL SULFATE 90 UG/1
2 AEROSOL, METERED RESPIRATORY (INHALATION) 4 TIMES DAILY PRN
Qty: 6.7 G | Refills: 0 | Status: SHIPPED | OUTPATIENT
Start: 2023-03-28 | End: 2023-04-26

## 2023-03-28 RX ORDER — MONTELUKAST SODIUM 10 MG/1
10 TABLET ORAL NIGHTLY
Qty: 90 TABLET | Refills: 1 | Status: SHIPPED | OUTPATIENT
Start: 2023-03-28 | End: 2023-09-25 | Stop reason: SDUPTHER

## 2023-03-28 RX ORDER — FLUTICASONE PROPIONATE 50 MCG
1 SPRAY, SUSPENSION (ML) NASAL 2 TIMES DAILY
Qty: 15.8 ML | Refills: 3 | Status: SHIPPED | OUTPATIENT
Start: 2023-03-28 | End: 2024-01-23 | Stop reason: SDUPTHER

## 2023-03-28 RX ORDER — FLASH GLUCOSE SENSOR
KIT MISCELLANEOUS
COMMUNITY
Start: 2023-03-02 | End: 2023-03-28

## 2023-03-28 RX ORDER — TIRZEPATIDE 7.5 MG/.5ML
7.5 INJECTION, SOLUTION SUBCUTANEOUS WEEKLY
Qty: 4 PEN | Refills: 2 | Status: SHIPPED | OUTPATIENT
Start: 2023-03-28 | End: 2023-04-03 | Stop reason: DRUGHIGH

## 2023-03-28 RX ORDER — NALOXONE HYDROCHLORIDE 4 MG/.1ML
SPRAY NASAL
COMMUNITY
Start: 2022-09-19

## 2023-03-28 RX ORDER — LOSARTAN POTASSIUM 50 MG/1
50 TABLET ORAL NIGHTLY
Qty: 90 TABLET | Refills: 1 | Status: SHIPPED | OUTPATIENT
Start: 2023-03-28 | End: 2023-09-25 | Stop reason: SDUPTHER

## 2023-03-28 RX ORDER — AZITHROMYCIN 500 MG/1
500 TABLET, FILM COATED ORAL DAILY
Qty: 5 TABLET | Refills: 0 | Status: SHIPPED | OUTPATIENT
Start: 2023-03-28 | End: 2023-04-02

## 2023-03-28 RX ORDER — AZELASTINE 1 MG/ML
1 SPRAY, METERED NASAL 2 TIMES DAILY
Qty: 30 ML | Refills: 2 | Status: SHIPPED | OUTPATIENT
Start: 2023-03-28 | End: 2023-05-25

## 2023-03-28 RX ORDER — CETIRIZINE HYDROCHLORIDE 10 MG/1
10 TABLET ORAL NIGHTLY
Qty: 90 TABLET | Refills: 1 | Status: SHIPPED | OUTPATIENT
Start: 2023-03-28 | End: 2023-09-25 | Stop reason: SDUPTHER

## 2023-03-28 RX ORDER — FLASH GLUCOSE SENSOR
2 KIT MISCELLANEOUS EVERY 4 HOURS PRN
Qty: 1 KIT | Refills: 4 | Status: SHIPPED | OUTPATIENT
Start: 2023-03-28 | End: 2023-09-20

## 2023-03-28 NOTE — TELEPHONE ENCOUNTER
Per [facility]-Take zithromax today and tomorrow and if not feeling better can come take steriod shot. Patient verbalized understanding.

## 2023-03-28 NOTE — PROGRESS NOTES
Subjective:       Patient ID: Sagrario Waller is a 47 y.o. female.    Chief Complaint: Sore Throat and Cough    The patient returns for fasting lab and medication follow up.  The patient knee lab but also has been having a sore throat that started at Sunday after she had been exposed to somebody coughing on her in the area and she is been noticing that when it started she had a bit of a chill but has not check for any fever now she is noticing that she is having more of a sore throat and it is beginning to cause her to also have some increased purulent sputum discharge with cough.  No shortness of breath or asthma symptoms at this time but she is not currently using her inhaler.  She will start she is finding that her blood sugar was dropping into the less than 60s when she was taking the metformin and she was beginning to feel heel and irritable so she stopped taking the metformin continues with the module row and is finding that her blood sugar is remaining in the 100s the 90s to 100s but she will go as high as the 150s whenever she eats foods that are not low in carbs.     Review of Systems   Constitutional:  Negative for activity change and appetite change.   HENT:  Positive for postnasal drip, rhinorrhea and sore throat. Negative for nasal congestion, ear pain, trouble swallowing and voice change.    Eyes: Negative.  Negative for visual disturbance.   Respiratory: Negative.  Negative for chest tightness, shortness of breath and wheezing.    Cardiovascular:  Negative for chest pain, palpitations and leg swelling.   Gastrointestinal:  Negative for abdominal distention, blood in stool, change in bowel habit, constipation, diarrhea and change in bowel habit.   Endocrine: Negative for cold intolerance, heat intolerance and polyuria.   Genitourinary:  Negative for bladder incontinence, difficulty urinating, flank pain and frequency.   Allergic/Immunologic: Negative for environmental allergies and food allergies.  "  Neurological:  Negative for vertigo, tremors, seizures, syncope, light-headedness, headaches, coordination difficulties and coordination difficulties.   Hematological:  Negative for adenopathy. Does not bruise/bleed easily.   Psychiatric/Behavioral:  Negative for agitation, decreased concentration, dysphoric mood, sleep disturbance and suicidal ideas.        Objective:      Vitals:    03/28/23 0803   BP: 124/72   Pulse: 83   Resp: 18   Temp: 97 °F (36.1 °C)   TempSrc: Temporal   SpO2: 99%   Weight: 103 kg (227 lb)   Height: 5' 7" (1.702 m)       Physical Exam  Vitals reviewed.   Constitutional:       General: She is not in acute distress.     Appearance: She is not ill-appearing.   HENT:      Head: Normocephalic and atraumatic.      Right Ear: No decreased hearing noted. Tympanic membrane is bulging. Tympanic membrane is not erythematous.      Left Ear: Tympanic membrane normal. No decreased hearing noted. Tympanic membrane is not erythematous or retracted.      Nose: Rhinorrhea present. No congestion.      Right Turbinates: Enlarged and swollen.      Left Turbinates: Enlarged and swollen.      Right Sinus: No maxillary sinus tenderness or frontal sinus tenderness.      Left Sinus: No maxillary sinus tenderness or frontal sinus tenderness.      Mouth/Throat:      Mouth: Mucous membranes are moist.      Tongue: No lesions.      Pharynx: Oropharyngeal exudate and posterior oropharyngeal erythema present.      Tonsils: Tonsillar exudate present. No tonsillar abscesses. 2+ on the right. 2+ on the left.   Eyes:      General: No scleral icterus.     Extraocular Movements: Extraocular movements intact.      Conjunctiva/sclera: Conjunctivae normal.      Pupils: Pupils are equal, round, and reactive to light.   Neck:      Vascular: No carotid bruit.   Cardiovascular:      Rate and Rhythm: Normal rate and regular rhythm.      Heart sounds: No murmur heard.    No friction rub. No gallop.   Pulmonary:      Effort: Pulmonary " effort is normal. No respiratory distress.      Breath sounds: Normal breath sounds. No wheezing, rhonchi or rales.   Musculoskeletal:         General: Normal range of motion.      Cervical back: Normal range of motion and neck supple.   Skin:     General: Skin is warm and dry.   Neurological:      Mental Status: She is alert and oriented to person, place, and time. Mental status is at baseline.   Psychiatric:         Mood and Affect: Mood normal.         Behavior: Behavior normal.       Assessment/Plan:     1. Diabetes mellitus without complication  Overview:  Lost 15# since starting Mounjaro, continue current    Orders:  -     POCT Glucose, Hand-Held Device    2. Primary hypertension  Assessment & Plan:  The current medical regimen is effective;  continue present plan and medications.    Orders:  -     losartan (COZAAR) 50 MG tablet; Take 1 tablet (50 mg total) by mouth every evening.  Dispense: 90 tablet; Refill: 1    3. Elevated lipoprotein(a)  Assessment & Plan:  The current medical regimen is effective;  continue present plan and medications.      4. Gastroesophageal reflux disease without esophagitis  Assessment & Plan:  The current medical regimen is effective;  continue present plan and medications.      5. Vasomotor rhinitis  Assessment & Plan:  The current medical regimen is effective;  continue present plan and medications.    Orders:  -     azelastine (ASTELIN) 137 mcg (0.1 %) nasal spray; 1 spray (137 mcg total) by Nasal route 2 (two) times daily.  Dispense: 30 mL; Refill: 2  -     cetirizine (ZYRTEC) 10 MG tablet; Take 1 tablet (10 mg total) by mouth every evening.  Dispense: 90 tablet; Refill: 1  -     fluticasone propionate (FLONASE) 50 mcg/actuation nasal spray; 1 spray (50 mcg total) by Each Nostril route 2 (two) times daily.  Dispense: 15.8 mL; Refill: 3    6. Asthma, unspecified asthma severity, unspecified whether complicated, unspecified whether persistent  Assessment & Plan:  The current  medical regimen is effective;  continue present plan and medications.      7. Mild intermittent asthma without complication  Assessment & Plan:  The current medical regimen is effective;  continue present plan and medications.    Orders:  -     albuterol (PROVENTIL/VENTOLIN HFA) 90 mcg/actuation inhaler; Inhale 2 puffs into the lungs 4 (four) times daily as needed for Wheezing.  Dispense: 6.7 g; Refill: 0    8. Pharyngitis, unspecified etiology  Assessment & Plan:  Started on Sunday progressing. Negative for strept. Purulent sputum and history recurrent bronchitis.          Follow up in about 3 months (around 6/28/2023).          Discussed the diagnosis, prognosis, plan of care, chronic nature of and indications for, risks and benefits of treatment for conditions.  Continue all medications as prescribed unless otherwise noted.   Call if patient develops other symptoms or if not better in 2-3 days and sooner if worse. Emphasized the importance of compliance with all recommendations, including medication use and timely follow up. Instructed to return as noted be or sooner if patient develops any other problems or if there are any other additional questions or concerns.

## 2023-03-28 NOTE — ASSESSMENT & PLAN NOTE
Started on Sunday progressing. Negative for strept. Purulent sputum and history recurrent bronchitis.

## 2023-03-29 ENCOUNTER — DOCUMENTATION ONLY (OUTPATIENT)
Dept: FAMILY MEDICINE | Facility: CLINIC | Age: 48
End: 2023-03-29
Payer: COMMERCIAL

## 2023-03-29 LAB
ALBUMIN SERPL-MCNC: 3.8 G/DL (ref 3.8–4.8)
ALBUMIN/GLOB SERPL: 1.2 {RATIO} (ref 1.2–2.2)
ALP SERPL-CCNC: 84 IU/L (ref 44–121)
ALT SERPL-CCNC: 13 IU/L (ref 0–32)
AST SERPL-CCNC: 19 IU/L (ref 0–40)
BASOPHILS # BLD AUTO: 0.1 X10E3/UL (ref 0–0.2)
BASOPHILS NFR BLD AUTO: 1 %
BILIRUB SERPL-MCNC: 0.2 MG/DL (ref 0–1.2)
BUN SERPL-MCNC: 12 MG/DL (ref 6–24)
BUN/CREAT SERPL: 18 (ref 9–23)
CALCIUM SERPL-MCNC: 9.4 MG/DL (ref 8.7–10.2)
CHLORIDE SERPL-SCNC: 103 MMOL/L (ref 96–106)
CHOLEST SERPL-MCNC: 162 MG/DL (ref 100–199)
CO2 SERPL-SCNC: 20 MMOL/L (ref 20–29)
CREAT SERPL-MCNC: 0.68 MG/DL (ref 0.57–1)
EOSINOPHIL # BLD AUTO: 0.3 X10E3/UL (ref 0–0.4)
EOSINOPHIL NFR BLD AUTO: 2 %
ERYTHROCYTE [DISTWIDTH] IN BLOOD BY AUTOMATED COUNT: 13.5 % (ref 11.7–15.4)
ERYTHROCYTE [SEDIMENTATION RATE] IN BLOOD BY WESTERGREN METHOD: 49 MM/HR (ref 0–32)
EST. GFR  (NO RACE VARIABLE): 107 ML/MIN/1.73
GLOBULIN SER CALC-MCNC: 3.3 G/DL (ref 1.5–4.5)
GLUCOSE SERPL-MCNC: 107 MG/DL (ref 70–99)
HBA1C MFR BLD: 5.9 % (ref 4.8–5.6)
HCT VFR BLD AUTO: 41.8 % (ref 34–46.6)
HCV IGG SERPL QL IA: NON REACTIVE
HDLC SERPL-MCNC: 42 MG/DL
HGB BLD-MCNC: 14 G/DL (ref 11.1–15.9)
HIV 1+2 AB+HIV1 P24 AG SERPL QL IA: NON REACTIVE
IMM GRANULOCYTES NFR BLD AUTO: 0 %
LDLC SERPL CALC-MCNC: 81 MG/DL (ref 0–99)
LYMPHOCYTES # BLD AUTO: 2.6 X10E3/UL (ref 0.7–3.1)
LYMPHOCYTES NFR BLD AUTO: 17 %
MCH RBC QN AUTO: 29.8 PG (ref 26.6–33)
MCHC RBC AUTO-ENTMCNC: 33.5 G/DL (ref 31.5–35.7)
MCV RBC AUTO: 89 FL (ref 79–97)
MONOCYTES # BLD AUTO: 1.2 X10E3/UL (ref 0.1–0.9)
MONOCYTES NFR BLD AUTO: 8 %
NEUTROPHILS # BLD AUTO: 11.5 X10E3/UL (ref 1.4–7)
NEUTROPHILS NFR BLD AUTO: 72 %
PLATELET # BLD AUTO: 327 X10E3/UL (ref 150–450)
POTASSIUM SERPL-SCNC: 4.2 MMOL/L (ref 3.5–5.2)
PROT SERPL-MCNC: 7.1 G/DL (ref 6–8.5)
RBC # BLD AUTO: 4.7 X10E6/UL (ref 3.77–5.28)
SODIUM SERPL-SCNC: 139 MMOL/L (ref 134–144)
TRIGL SERPL-MCNC: 235 MG/DL (ref 0–149)
URATE SERPL-MCNC: 5.8 MG/DL (ref 2.6–6.2)
VLDLC SERPL CALC-MCNC: 39 MG/DL (ref 5–40)
WBC # BLD AUTO: 15.8 X10E3/UL (ref 3.4–10.8)

## 2023-03-29 NOTE — PROGRESS NOTES
Notify normal cmp, greatly improved liver enzymes with diabetes control, LDL down to 81, but trig still elevated at 235, goal <150, hgb a1c down to 5.9, much improved. Elevated sed, wbc, continue treatment for asthma and infection, hcv & hiv negative, uric acid at goal 5.8. refill Glucophage, increase crestor 20 mg qhs, continue mounjaro, can increase to 10 if available, recheck cmp, flp, cbc, hgb a1c, vitamin B12, urine micro in 6 months.

## 2023-03-30 ENCOUNTER — OFFICE VISIT (OUTPATIENT)
Dept: FAMILY MEDICINE | Facility: CLINIC | Age: 48
End: 2023-03-30
Payer: COMMERCIAL

## 2023-03-30 ENCOUNTER — TELEPHONE (OUTPATIENT)
Dept: FAMILY MEDICINE | Facility: CLINIC | Age: 48
End: 2023-03-30

## 2023-03-30 VITALS
HEART RATE: 89 BPM | WEIGHT: 229 LBS | TEMPERATURE: 97 F | BODY MASS INDEX: 35.94 KG/M2 | OXYGEN SATURATION: 95 % | RESPIRATION RATE: 18 BRPM | DIASTOLIC BLOOD PRESSURE: 62 MMHG | SYSTOLIC BLOOD PRESSURE: 116 MMHG | HEIGHT: 67 IN

## 2023-03-30 DIAGNOSIS — J02.0 STREP PHARYNGITIS: Primary | ICD-10-CM

## 2023-03-30 DIAGNOSIS — E11.9 DIABETES MELLITUS WITHOUT COMPLICATION: ICD-10-CM

## 2023-03-30 LAB
GLUCOSE SERPL-MCNC: 98 MG/DL (ref 70–110)
S PYO THROAT QL CULT: POSITIVE

## 2023-03-30 PROCEDURE — 82962 GLUCOSE BLOOD TEST: CPT | Mod: ,,, | Performed by: NURSE PRACTITIONER

## 2023-03-30 PROCEDURE — 3044F HG A1C LEVEL LT 7.0%: CPT | Mod: CPTII,,, | Performed by: NURSE PRACTITIONER

## 2023-03-30 PROCEDURE — 3078F PR MOST RECENT DIASTOLIC BLOOD PRESSURE < 80 MM HG: ICD-10-PCS | Mod: CPTII,,, | Performed by: NURSE PRACTITIONER

## 2023-03-30 PROCEDURE — 1159F PR MEDICATION LIST DOCUMENTED IN MEDICAL RECORD: ICD-10-PCS | Mod: CPTII,,, | Performed by: NURSE PRACTITIONER

## 2023-03-30 PROCEDURE — 96372 THER/PROPH/DIAG INJ SC/IM: CPT | Mod: ,,, | Performed by: NURSE PRACTITIONER

## 2023-03-30 PROCEDURE — 99213 OFFICE O/P EST LOW 20 MIN: CPT | Mod: 25,,, | Performed by: NURSE PRACTITIONER

## 2023-03-30 PROCEDURE — 99213 PR OFFICE/OUTPT VISIT, EST, LEVL III, 20-29 MIN: ICD-10-PCS | Mod: 25,,, | Performed by: NURSE PRACTITIONER

## 2023-03-30 PROCEDURE — 3008F BODY MASS INDEX DOCD: CPT | Mod: CPTII,,, | Performed by: NURSE PRACTITIONER

## 2023-03-30 PROCEDURE — 1159F MED LIST DOCD IN RCRD: CPT | Mod: CPTII,,, | Performed by: NURSE PRACTITIONER

## 2023-03-30 PROCEDURE — 96372 PR INJECTION,THERAP/PROPH/DIAG2ST, IM OR SUBCUT: ICD-10-PCS | Mod: ,,, | Performed by: NURSE PRACTITIONER

## 2023-03-30 PROCEDURE — 1160F RVW MEDS BY RX/DR IN RCRD: CPT | Mod: CPTII,,, | Performed by: NURSE PRACTITIONER

## 2023-03-30 PROCEDURE — 3078F DIAST BP <80 MM HG: CPT | Mod: CPTII,,, | Performed by: NURSE PRACTITIONER

## 2023-03-30 PROCEDURE — 1160F PR REVIEW ALL MEDS BY PRESCRIBER/CLIN PHARMACIST DOCUMENTED: ICD-10-PCS | Mod: CPTII,,, | Performed by: NURSE PRACTITIONER

## 2023-03-30 PROCEDURE — 3044F PR MOST RECENT HEMOGLOBIN A1C LEVEL <7.0%: ICD-10-PCS | Mod: CPTII,,, | Performed by: NURSE PRACTITIONER

## 2023-03-30 PROCEDURE — 82962 POCT GLUCOSE, HAND-HELD DEVICE: ICD-10-PCS | Mod: ,,, | Performed by: NURSE PRACTITIONER

## 2023-03-30 PROCEDURE — 4010F PR ACE/ARB THEARPY RXD/TAKEN: ICD-10-PCS | Mod: CPTII,,, | Performed by: NURSE PRACTITIONER

## 2023-03-30 PROCEDURE — 3008F PR BODY MASS INDEX (BMI) DOCUMENTED: ICD-10-PCS | Mod: CPTII,,, | Performed by: NURSE PRACTITIONER

## 2023-03-30 PROCEDURE — 3074F SYST BP LT 130 MM HG: CPT | Mod: CPTII,,, | Performed by: NURSE PRACTITIONER

## 2023-03-30 PROCEDURE — 3074F PR MOST RECENT SYSTOLIC BLOOD PRESSURE < 130 MM HG: ICD-10-PCS | Mod: CPTII,,, | Performed by: NURSE PRACTITIONER

## 2023-03-30 PROCEDURE — 4010F ACE/ARB THERAPY RXD/TAKEN: CPT | Mod: CPTII,,, | Performed by: NURSE PRACTITIONER

## 2023-03-30 RX ORDER — DEXAMETHASONE SODIUM PHOSPHATE 100 MG/10ML
10 INJECTION INTRAMUSCULAR; INTRAVENOUS
Status: COMPLETED | OUTPATIENT
Start: 2023-03-30 | End: 2023-03-30

## 2023-03-30 RX ADMIN — DEXAMETHASONE SODIUM PHOSPHATE 10 MG: 100 INJECTION INTRAMUSCULAR; INTRAVENOUS at 10:03

## 2023-03-30 NOTE — TELEPHONE ENCOUNTER
Pt has a follow up with shoshana today and will discuss labs at visit recheck cmp, flp, cbc, hgb a1c, vitamin B12, urine micro in 6 months.

## 2023-03-30 NOTE — PROGRESS NOTES
Subjective:       Patient ID: Sagrario Waller is a 47 y.o. female.    Chief Complaint: Seen on 3/28, not feeling any better, requests shot and Sore Throat (Given Zithromax on 3/28 visit)    Sore Throat   This is a new problem. The current episode started in the past 7 days. The problem has been gradually worsening. There has been no fever. Associated symptoms include congestion, coughing, ear pain and a plugged ear sensation. Pertinent negatives include no headaches or shortness of breath. She has had exposure to strep. She has tried NSAIDs, cool liquids, gargles and acetaminophen for the symptoms. The treatment provided mild relief.   Review of Systems   HENT:  Positive for nasal congestion, ear pain and sore throat.    Respiratory:  Positive for cough. Negative for shortness of breath.    Neurological:  Negative for headaches.       Objective:      Physical Exam  Vitals and nursing note reviewed.   Constitutional:       Appearance: Normal appearance.   HENT:      Head: Normocephalic and atraumatic.      Right Ear: Ear canal and external ear normal. A middle ear effusion is present.      Left Ear: Ear canal and external ear normal. A middle ear effusion is present.      Nose: Nose normal.      Mouth/Throat:      Mouth: Mucous membranes are moist.      Pharynx: Oropharynx is clear.      Tonsils: Tonsillar exudate and tonsillar abscess present.   Eyes:      Extraocular Movements: Extraocular movements intact.      Conjunctiva/sclera: Conjunctivae normal.      Pupils: Pupils are equal, round, and reactive to light.   Cardiovascular:      Rate and Rhythm: Normal rate and regular rhythm.      Pulses: Normal pulses.      Heart sounds: Normal heart sounds.   Pulmonary:      Effort: Pulmonary effort is normal.      Breath sounds: Normal breath sounds.   Abdominal:      General: Abdomen is flat. Bowel sounds are normal.      Palpations: Abdomen is soft.   Musculoskeletal:         General: Normal range of motion.       Cervical back: Normal range of motion.   Skin:     General: Skin is warm and dry.      Capillary Refill: Capillary refill takes less than 2 seconds.   Neurological:      General: No focal deficit present.      Mental Status: She is alert.   Psychiatric:         Mood and Affect: Mood normal.         Behavior: Behavior normal.         Thought Content: Thought content normal.         Judgment: Judgment normal.       Assessment/Plan:     Vitals:    03/30/23 0953   BP: 116/62   Pulse: 89   Resp: 18   Temp: 96.6 °F (35.9 °C)        1. Strep pharyngitis  Assessment & Plan:  No relief with zpack   Throat culture +strep   Bicillin g and dexamethasone administered       2. Diabetes mellitus without complication  Overview:  Lost 15# since starting Mounjaro, continue current    Orders:  -     POCT Glucose, Hand-Held Device           Follow up if symptoms worsen or fail to improve.   Discussed the diagnosis, prognosis, plan of care, chronic nature of and indications for, risks and benefits of treatment for conditions.  Continue all medications as prescribed unless otherwise noted.   Call if patient develops other symptoms or if not better in 2-3 days and sooner if worse. Emphasized the importance of compliance with all recommendations, including medication use and timely follow up. Instructed to return as noted be or sooner if patient develops any other problems or if there are any other additional questions or concerns.

## 2023-03-30 NOTE — LETTER
March 30, 2023      [facility]  [address]  [place] [address]  Phone: [phone]  Fax: [phone]       Patient: Sagrario Waller   YOB: 1975  Date of Visit: 03/30/2023    To Whom It May Concern:    SUSAN Waller  was at [facility] on 03/30/2023. The patient may return to work 04/03/2023 with no restrictions. If you have any questions or concerns, or if I can be of further assistance, please do not hesitate to contact me.    Sincerely,    Uyen Lopez LPN

## 2023-03-30 NOTE — TELEPHONE ENCOUNTER
----- Message from Genesis Lopez DNP sent at 3/29/2023  5:33 PM CDT -----  Notify normal cmp, greatly improved liver enzymes with diabetes control, LDL down to 81, but trig still elevated at 235, goal <150, hgb a1c down to 5.9, much improved. Elevated sed, wbc, continue treatment for asthma and infection, hcv & hiv negative, uric acid at goal 5.8. refill Glucophage, increase crestor 20 mg qhs, continue mounjaro, can increase to 10 if available, recheck cmp, flp, cbc, hgb a1c, vitamin B12, urine micro in 6 months.

## 2023-04-03 ENCOUNTER — OFFICE VISIT (OUTPATIENT)
Dept: FAMILY MEDICINE | Facility: CLINIC | Age: 48
End: 2023-04-03
Payer: COMMERCIAL

## 2023-04-03 VITALS
HEART RATE: 92 BPM | HEIGHT: 67 IN | OXYGEN SATURATION: 98 % | BODY MASS INDEX: 35.31 KG/M2 | WEIGHT: 225 LBS | DIASTOLIC BLOOD PRESSURE: 62 MMHG | TEMPERATURE: 97 F | SYSTOLIC BLOOD PRESSURE: 118 MMHG | RESPIRATION RATE: 18 BRPM

## 2023-04-03 DIAGNOSIS — D51.8 VITAMIN B12 DEFICIENCY (DIETARY) ANEMIA: ICD-10-CM

## 2023-04-03 DIAGNOSIS — B37.31 VAGINAL CANDIDA: ICD-10-CM

## 2023-04-03 DIAGNOSIS — E11.9 DIABETES MELLITUS WITHOUT COMPLICATION: Primary | ICD-10-CM

## 2023-04-03 DIAGNOSIS — J02.0 STREPTOCOCCUS PHARYNGITIS: ICD-10-CM

## 2023-04-03 DIAGNOSIS — E55.9 VITAMIN D DEFICIENCY: ICD-10-CM

## 2023-04-03 DIAGNOSIS — I10 PRIMARY HYPERTENSION: ICD-10-CM

## 2023-04-03 DIAGNOSIS — L70.0 ACNE VULGARIS: ICD-10-CM

## 2023-04-03 LAB — GLUCOSE SERPL-MCNC: 111 MG/DL (ref 70–110)

## 2023-04-03 PROCEDURE — 3078F DIAST BP <80 MM HG: CPT | Mod: CPTII,,, | Performed by: NURSE PRACTITIONER

## 2023-04-03 PROCEDURE — 4010F PR ACE/ARB THEARPY RXD/TAKEN: ICD-10-PCS | Mod: CPTII,,, | Performed by: NURSE PRACTITIONER

## 2023-04-03 PROCEDURE — 3008F PR BODY MASS INDEX (BMI) DOCUMENTED: ICD-10-PCS | Mod: CPTII,,, | Performed by: NURSE PRACTITIONER

## 2023-04-03 PROCEDURE — 1159F MED LIST DOCD IN RCRD: CPT | Mod: CPTII,,, | Performed by: NURSE PRACTITIONER

## 2023-04-03 PROCEDURE — 4010F ACE/ARB THERAPY RXD/TAKEN: CPT | Mod: CPTII,,, | Performed by: NURSE PRACTITIONER

## 2023-04-03 PROCEDURE — 3078F PR MOST RECENT DIASTOLIC BLOOD PRESSURE < 80 MM HG: ICD-10-PCS | Mod: CPTII,,, | Performed by: NURSE PRACTITIONER

## 2023-04-03 PROCEDURE — 3074F SYST BP LT 130 MM HG: CPT | Mod: CPTII,,, | Performed by: NURSE PRACTITIONER

## 2023-04-03 PROCEDURE — 82962 POCT GLUCOSE, HAND-HELD DEVICE: ICD-10-PCS | Mod: ,,, | Performed by: NURSE PRACTITIONER

## 2023-04-03 PROCEDURE — 82962 GLUCOSE BLOOD TEST: CPT | Mod: ,,, | Performed by: NURSE PRACTITIONER

## 2023-04-03 PROCEDURE — 3044F PR MOST RECENT HEMOGLOBIN A1C LEVEL <7.0%: ICD-10-PCS | Mod: CPTII,,, | Performed by: NURSE PRACTITIONER

## 2023-04-03 PROCEDURE — 99213 OFFICE O/P EST LOW 20 MIN: CPT | Mod: ,,, | Performed by: NURSE PRACTITIONER

## 2023-04-03 PROCEDURE — 3044F HG A1C LEVEL LT 7.0%: CPT | Mod: CPTII,,, | Performed by: NURSE PRACTITIONER

## 2023-04-03 PROCEDURE — 99213 PR OFFICE/OUTPT VISIT, EST, LEVL III, 20-29 MIN: ICD-10-PCS | Mod: ,,, | Performed by: NURSE PRACTITIONER

## 2023-04-03 PROCEDURE — 3008F BODY MASS INDEX DOCD: CPT | Mod: CPTII,,, | Performed by: NURSE PRACTITIONER

## 2023-04-03 PROCEDURE — 1159F PR MEDICATION LIST DOCUMENTED IN MEDICAL RECORD: ICD-10-PCS | Mod: CPTII,,, | Performed by: NURSE PRACTITIONER

## 2023-04-03 PROCEDURE — 3074F PR MOST RECENT SYSTOLIC BLOOD PRESSURE < 130 MM HG: ICD-10-PCS | Mod: CPTII,,, | Performed by: NURSE PRACTITIONER

## 2023-04-03 RX ORDER — AMOXICILLIN AND CLAVULANATE POTASSIUM 875; 125 MG/1; MG/1
1 TABLET, FILM COATED ORAL EVERY 12 HOURS
Qty: 10 TABLET | Refills: 1 | Status: SHIPPED | OUTPATIENT
Start: 2023-04-03 | End: 2023-06-27 | Stop reason: ALTCHOICE

## 2023-04-03 RX ORDER — TRETINOIN 0.5 MG/G
CREAM TOPICAL NIGHTLY
Qty: 90 G | Refills: 1 | Status: SHIPPED | OUTPATIENT
Start: 2023-04-03 | End: 2023-05-03

## 2023-04-03 RX ORDER — CYANOCOBALAMIN 1000 UG/ML
1000 INJECTION, SOLUTION INTRAMUSCULAR; SUBCUTANEOUS
Qty: 10 ML | Refills: 1 | Status: SHIPPED | OUTPATIENT
Start: 2023-04-03 | End: 2024-01-23 | Stop reason: SDUPTHER

## 2023-04-03 RX ORDER — FLUCONAZOLE 150 MG/1
150 TABLET ORAL DAILY
Qty: 2 TABLET | Refills: 0 | Status: SHIPPED | OUTPATIENT
Start: 2023-04-03 | End: 2023-06-27 | Stop reason: ALTCHOICE

## 2023-04-03 RX ORDER — ASPIRIN 325 MG
50000 TABLET, DELAYED RELEASE (ENTERIC COATED) ORAL
Qty: 12 CAPSULE | Refills: 1 | Status: SHIPPED | OUTPATIENT
Start: 2023-04-03

## 2023-04-03 NOTE — PROGRESS NOTES
"Subjective:       Patient ID: Sagrario Waller is a 47 y.o. female.    Chief Complaint: Sore Throat (Ongoing, finished ABX)    Sore Throat   This is a recurrent problem. The current episode started in the past 7 days. The problem has been waxing and waning. There has been no fever. The pain is at a severity of 7/10. Associated symptoms include swollen glands and trouble swallowing. Pertinent negatives include no congestion, headaches or shortness of breath. She has tried cool liquids for the symptoms. The treatment provided mild relief.   Review of Systems   Constitutional:  Negative for activity change and appetite change.   HENT:  Positive for sore throat and trouble swallowing. Negative for nasal congestion and voice change.    Eyes: Negative.  Negative for visual disturbance.   Respiratory: Negative.  Negative for chest tightness, shortness of breath and wheezing.    Cardiovascular:  Negative for chest pain, palpitations and leg swelling.   Endocrine: Negative for cold intolerance, heat intolerance and polyuria.   Genitourinary:  Negative for bladder incontinence, difficulty urinating, flank pain and frequency.   Allergic/Immunologic: Negative for environmental allergies and food allergies.   Neurological:  Negative for vertigo, tremors, seizures, syncope, light-headedness, headaches, coordination difficulties and coordination difficulties.   Hematological:  Negative for adenopathy. Does not bruise/bleed easily.   Psychiatric/Behavioral:  Negative for agitation, sleep disturbance and suicidal ideas.        Objective:      Vitals:    04/03/23 1608   BP: 118/62   Pulse: 92   Resp: 18   Temp: 97.1 °F (36.2 °C)   TempSrc: Temporal   SpO2: 98%   Weight: 102.1 kg (225 lb)   Height: 5' 7" (1.702 m)       Physical Exam  Constitutional:       General: She is not in acute distress.     Appearance: Normal appearance. She is well-groomed. She is not ill-appearing.   HENT:      Head: Normocephalic and atraumatic.      Right " Ear: Tympanic membrane normal.      Left Ear: Tympanic membrane normal.      Nose: Nose normal.      Mouth/Throat:      Mouth: Mucous membranes are moist.      Pharynx: Oropharynx is clear. Posterior oropharyngeal erythema present. No oropharyngeal exudate.   Eyes:      General: No scleral icterus.     Extraocular Movements: Extraocular movements intact.      Conjunctiva/sclera: Conjunctivae normal.      Pupils: Pupils are equal, round, and reactive to light.   Cardiovascular:      Rate and Rhythm: Normal rate and regular rhythm.   Pulmonary:      Effort: Pulmonary effort is normal.      Breath sounds: Normal breath sounds.   Abdominal:      General: Abdomen is flat. Bowel sounds are normal.      Palpations: Abdomen is soft.   Musculoskeletal:         General: Normal range of motion.      Cervical back: Normal range of motion and neck supple.   Skin:     General: Skin is warm and dry.   Neurological:      General: No focal deficit present.      Mental Status: She is alert and oriented to person, place, and time. Mental status is at baseline.   Psychiatric:         Attention and Perception: Attention and perception normal. She does not perceive auditory or visual hallucinations.         Mood and Affect: Mood is depressed.         Behavior: Behavior normal. Behavior is not agitated, aggressive or withdrawn. Behavior is cooperative.         Thought Content: Thought content normal. Thought content is not paranoid or delusional. Thought content does not include homicidal or suicidal ideation.         Cognition and Memory: Cognition normal.         Judgment: Judgment normal.       Assessment/Plan:     1. Diabetes mellitus without complication  Overview:  Lost 15# since starting Mounjaro, increase to 10 mg if doing well in one month can change to 3 months supply    Assessment & Plan:  Doing well. Increase Mounjaro 10 mg weekly.     Orders:  -     POCT Glucose, Hand-Held Device  -     tirzepatide 10 mg/0.5 mL PnIj; Inject  10 mg into the skin every 7 days.  Dispense: 4 pen; Refill: 1    2. Primary hypertension    3. Vaginal candida  Assessment & Plan:  Recurrent yeast infection with treatment to diflucan as needed.     Orders:  -     fluconazole (DIFLUCAN) 150 MG Tab; Take 1 tablet (150 mg total) by mouth once daily.  Dispense: 2 tablet; Refill: 0    4. Streptococcus pharyngitis  -     amoxicillin-clavulanate 875-125mg (AUGMENTIN) 875-125 mg per tablet; Take 1 tablet by mouth every 12 (twelve) hours.  Dispense: 10 tablet; Refill: 1  -     (Magic mouthwash) 1:1 Benadryl 12.5mg/5ml liq, mylanta, LIDOcaine viscous 2%, nystatin 100,000u, prednisolone 15mg/5mL, distilled water; Swish and swallow 10 mLs every 4 (four) hours as needed (pain).  Dispense: 120 mL; Refill: 1    5. Acne vulgaris  -     tretinoin (RETIN-A) 0.05 % cream; Apply topically every evening.  Dispense: 90 g; Refill: 1    6. Vitamin D deficiency  -     cholecalciferol, vitamin D3, 1,250 mcg (50,000 unit) capsule; Take 1 capsule (50,000 Units total) by mouth every 7 days.  Dispense: 12 capsule; Refill: 1    7. Vitamin B12 deficiency (dietary) anemia  -     cyanocobalamin 1,000 mcg/mL injection; Inject 1 mL (1,000 mcg total) into the muscle every 14 (fourteen) days.  Dispense: 10 mL; Refill: 1       Follow up in about 3 months (around 7/3/2023), or if symptoms worsen or fail to improve.          Discussed the diagnosis, prognosis, plan of care, chronic nature of and indications for, risks and benefits of treatment for conditions.  Continue all medications as prescribed unless otherwise noted.   Call if patient develops other symptoms or if not better in 2-3 days and sooner if worse. Emphasized the importance of compliance with all recommendations, including medication use and timely follow up. Instructed to return as noted be or sooner if patient develops any other problems or if there are any other additional questions or concerns.

## 2023-04-04 ENCOUNTER — TELEPHONE (OUTPATIENT)
Dept: FAMILY MEDICINE | Facility: CLINIC | Age: 48
End: 2023-04-04
Payer: COMMERCIAL

## 2023-04-04 NOTE — TELEPHONE ENCOUNTER
----- Message from Cary Peng sent at 4/4/2023  9:37 AM CDT -----  Patient called to let [facility] know that the script for antibiotics was sent in for 5 days, not 10.  Please send in another script for additional 5 days.

## 2023-04-04 NOTE — TELEPHONE ENCOUNTER
Script was sent at appt for 5 days with 1 refill. Advised pt to refill in 5 days to get full 10 days of treatment. If still having issues, contact office.

## 2023-05-25 DIAGNOSIS — J30.0 VASOMOTOR RHINITIS: ICD-10-CM

## 2023-05-25 DIAGNOSIS — J45.20 MILD INTERMITTENT ASTHMA WITHOUT COMPLICATION: ICD-10-CM

## 2023-05-25 DIAGNOSIS — E11.9 DIABETES MELLITUS WITHOUT COMPLICATION: ICD-10-CM

## 2023-05-25 RX ORDER — AZELASTINE 1 MG/ML
SPRAY, METERED NASAL
Qty: 30 ML | Refills: 2 | Status: SHIPPED | OUTPATIENT
Start: 2023-05-25 | End: 2024-01-23 | Stop reason: SDUPTHER

## 2023-05-25 RX ORDER — ALBUTEROL SULFATE 90 UG/1
AEROSOL, METERED RESPIRATORY (INHALATION)
Qty: 6.7 G | Refills: 0 | Status: SHIPPED | OUTPATIENT
Start: 2023-05-25 | End: 2024-03-04 | Stop reason: SDUPTHER

## 2023-05-25 RX ORDER — TIRZEPATIDE 10 MG/.5ML
INJECTION, SOLUTION SUBCUTANEOUS
Qty: 4 PEN | Refills: 1 | Status: SHIPPED | OUTPATIENT
Start: 2023-05-25 | End: 2023-06-27 | Stop reason: DRUGHIGH

## 2023-06-27 ENCOUNTER — OFFICE VISIT (OUTPATIENT)
Dept: FAMILY MEDICINE | Facility: CLINIC | Age: 48
End: 2023-06-27
Payer: COMMERCIAL

## 2023-06-27 VITALS
WEIGHT: 214 LBS | HEART RATE: 85 BPM | SYSTOLIC BLOOD PRESSURE: 112 MMHG | RESPIRATION RATE: 20 BRPM | DIASTOLIC BLOOD PRESSURE: 64 MMHG | BODY MASS INDEX: 44.92 KG/M2 | TEMPERATURE: 97 F | HEIGHT: 58 IN | OXYGEN SATURATION: 97 %

## 2023-06-27 DIAGNOSIS — E78.5 HYPERLIPIDEMIA, UNSPECIFIED HYPERLIPIDEMIA TYPE: ICD-10-CM

## 2023-06-27 DIAGNOSIS — E11.9 TYPE 2 DIABETES MELLITUS WITHOUT COMPLICATION, WITHOUT LONG-TERM CURRENT USE OF INSULIN: Primary | ICD-10-CM

## 2023-06-27 DIAGNOSIS — E11.9 COMPREHENSIVE DIABETIC FOOT EXAMINATION, TYPE 2 DM, ENCOUNTER FOR: ICD-10-CM

## 2023-06-27 DIAGNOSIS — K59.04 CHRONIC IDIOPATHIC CONSTIPATION: ICD-10-CM

## 2023-06-27 DIAGNOSIS — L84 CALLUS OF FOOT: ICD-10-CM

## 2023-06-27 DIAGNOSIS — E11.9 DIABETES MELLITUS WITHOUT COMPLICATION: ICD-10-CM

## 2023-06-27 DIAGNOSIS — J45.30 MILD PERSISTENT ASTHMA WITHOUT COMPLICATION: ICD-10-CM

## 2023-06-27 DIAGNOSIS — M77.9 INFLAMMATION AROUND JOINT: ICD-10-CM

## 2023-06-27 PROBLEM — E11.621 TYPE 2 DIABETES MELLITUS WITH FOOT ULCER, WITHOUT LONG-TERM CURRENT USE OF INSULIN: Status: ACTIVE | Noted: 2023-06-27

## 2023-06-27 PROBLEM — L97.509 TYPE 2 DIABETES MELLITUS WITH FOOT ULCER, WITHOUT LONG-TERM CURRENT USE OF INSULIN: Status: ACTIVE | Noted: 2023-06-27

## 2023-06-27 LAB — GLUCOSE SERPL-MCNC: 99 MG/DL (ref 70–110)

## 2023-06-27 PROCEDURE — 4010F ACE/ARB THERAPY RXD/TAKEN: CPT | Mod: CPTII,,, | Performed by: NURSE PRACTITIONER

## 2023-06-27 PROCEDURE — 99214 PR OFFICE/OUTPT VISIT, EST, LEVL IV, 30-39 MIN: ICD-10-PCS | Mod: ,,, | Performed by: NURSE PRACTITIONER

## 2023-06-27 PROCEDURE — 3074F PR MOST RECENT SYSTOLIC BLOOD PRESSURE < 130 MM HG: ICD-10-PCS | Mod: CPTII,,, | Performed by: NURSE PRACTITIONER

## 2023-06-27 PROCEDURE — 3008F PR BODY MASS INDEX (BMI) DOCUMENTED: ICD-10-PCS | Mod: CPTII,,, | Performed by: NURSE PRACTITIONER

## 2023-06-27 PROCEDURE — 82962 POCT GLUCOSE, HAND-HELD DEVICE: ICD-10-PCS | Mod: ,,, | Performed by: NURSE PRACTITIONER

## 2023-06-27 PROCEDURE — 1160F PR REVIEW ALL MEDS BY PRESCRIBER/CLIN PHARMACIST DOCUMENTED: ICD-10-PCS | Mod: CPTII,,, | Performed by: NURSE PRACTITIONER

## 2023-06-27 PROCEDURE — 3044F PR MOST RECENT HEMOGLOBIN A1C LEVEL <7.0%: ICD-10-PCS | Mod: CPTII,,, | Performed by: NURSE PRACTITIONER

## 2023-06-27 PROCEDURE — 4010F PR ACE/ARB THEARPY RXD/TAKEN: ICD-10-PCS | Mod: CPTII,,, | Performed by: NURSE PRACTITIONER

## 2023-06-27 PROCEDURE — 3044F HG A1C LEVEL LT 7.0%: CPT | Mod: CPTII,,, | Performed by: NURSE PRACTITIONER

## 2023-06-27 PROCEDURE — 1159F PR MEDICATION LIST DOCUMENTED IN MEDICAL RECORD: ICD-10-PCS | Mod: CPTII,,, | Performed by: NURSE PRACTITIONER

## 2023-06-27 PROCEDURE — 82962 GLUCOSE BLOOD TEST: CPT | Mod: ,,, | Performed by: NURSE PRACTITIONER

## 2023-06-27 PROCEDURE — 3074F SYST BP LT 130 MM HG: CPT | Mod: CPTII,,, | Performed by: NURSE PRACTITIONER

## 2023-06-27 PROCEDURE — 99214 OFFICE O/P EST MOD 30 MIN: CPT | Mod: ,,, | Performed by: NURSE PRACTITIONER

## 2023-06-27 PROCEDURE — 1159F MED LIST DOCD IN RCRD: CPT | Mod: CPTII,,, | Performed by: NURSE PRACTITIONER

## 2023-06-27 PROCEDURE — 3008F BODY MASS INDEX DOCD: CPT | Mod: CPTII,,, | Performed by: NURSE PRACTITIONER

## 2023-06-27 PROCEDURE — 1160F RVW MEDS BY RX/DR IN RCRD: CPT | Mod: CPTII,,, | Performed by: NURSE PRACTITIONER

## 2023-06-27 PROCEDURE — 3078F DIAST BP <80 MM HG: CPT | Mod: CPTII,,, | Performed by: NURSE PRACTITIONER

## 2023-06-27 PROCEDURE — 3078F PR MOST RECENT DIASTOLIC BLOOD PRESSURE < 80 MM HG: ICD-10-PCS | Mod: CPTII,,, | Performed by: NURSE PRACTITIONER

## 2023-06-27 RX ORDER — FLUTICASONE FUROATE, UMECLIDINIUM BROMIDE AND VILANTEROL TRIFENATATE 200; 62.5; 25 UG/1; UG/1; UG/1
1 POWDER RESPIRATORY (INHALATION) DAILY
Qty: 1 EACH | Refills: 3 | Status: SHIPPED | OUTPATIENT
Start: 2023-06-27 | End: 2024-01-23 | Stop reason: SDUPTHER

## 2023-06-27 RX ORDER — LANOLIN ALCOHOL/MO/W.PET/CERES
400 CREAM (GRAM) TOPICAL DAILY
COMMUNITY

## 2023-06-27 NOTE — PROGRESS NOTES
"Subjective:       Patient ID: Sagrario Waller is a 47 y.o. female.    Chief Complaint: Follow-up (3 mth f/u on dm. Currently on mounjaro. Still losing weight. However feels like she is needing to increase due to sugar starting to go up. States that mounjaro has been causing constipation.)    The patient returns for follow-up with diabetes and chronic condition management.  She is doing well and finds that her blood sugars or much better she has continued to lose weight and is eating healthier.  Her only issue is that recently her blood sugars have risen to 140-150.  She. is having a severe complaint of constipation that is not improved with MiraLax and bulk stool.  She is also feeling her asthma has improved and she is experiencing some left arch foot discomfort.    Review of Systems    See HPI  Objective:      Vitals:    06/27/23 0744   BP: 112/64   Pulse: 85   Resp: 20   Temp: 97.3 °F (36.3 °C)   SpO2: 97%   Weight: 97.1 kg (214 lb)   Height: 4' 9.5" (1.461 m)       Physical Exam  Constitutional:       Appearance: Normal appearance.   HENT:      Head: Normocephalic.      Nose: Nose normal.      Mouth/Throat:      Mouth: Mucous membranes are dry.   Eyes:      Extraocular Movements: Extraocular movements intact.      Conjunctiva/sclera: Conjunctivae normal.   Cardiovascular:      Rate and Rhythm: Normal rate and regular rhythm.      Pulses: Normal pulses.           Dorsalis pedis pulses are 2+ on the right side and 2+ on the left side.        Posterior tibial pulses are 2+ on the right side and 2+ on the left side.   Pulmonary:      Effort: Pulmonary effort is normal.      Breath sounds: Normal breath sounds.   Abdominal:      General: Bowel sounds are normal.      Palpations: Abdomen is soft.   Musculoskeletal:         General: Normal range of motion.      Cervical back: Normal range of motion.        Feet:    Feet:      Right foot:      Protective Sensation: 5 sites tested.  5 sites sensed.      Skin integrity: " Callus present.      Left foot:      Protective Sensation: 5 sites tested.  5 sites sensed.      Skin integrity: Skin integrity normal.      Comments: callus  Skin:     General: Skin is warm and dry.      Capillary Refill: Capillary refill takes less than 2 seconds.   Neurological:      General: No focal deficit present.      Mental Status: She is alert.   Psychiatric:         Mood and Affect: Mood normal.       Assessment/Plan:     1. Type 2 diabetes mellitus without complication, without long-term current use of insulin  Overview:  Lost 15# since starting Mounjaro, increase to 10 mg if doing well in one month can change to 3 months supply    Assessment & Plan:  We will notify with lab and increase module row from 10-12.5.  Doing well except for the constipation goal is to have fasting glucose between 70 and 120 and postprandial less than 140.  Continue healthy eating patterns notify any trouble return in 3 months unless any issues.    Orders:  -     POCT Glucose, Hand-Held Device  -     Cancel: Microalbumin/Creatinine Ratio, Urine  -     Microalbumin/Creatinine Ratio, Urine  -     tirzepatide 12.5 mg/0.5 mL PnIj; Inject 12.5 mg into the skin every 7 days.  Dispense: 4 pen; Refill: 3  -     CBC Auto Differential  -     Comprehensive Metabolic Panel  -     Hemoglobin A1C ([facility] ONLY)    2. Callus of foot  Assessment & Plan:  Currently no foot ulcerations or concerns discussed foot care for diabetes      3. Comprehensive diabetic foot examination, type 2 DM, encounter for  Assessment & Plan:  Slight callus to left 1st lateral MTP callus without ulceration.  Slight point tenderness at plantar surface of arch of foot beyond this normal foot exam discussed foot care for living with diabetes      4. Chronic idiopathic constipation  Assessment & Plan:  Longstanding history of irritable bowel with constipation has tried every over-the-counter methods but we will begin Linzess 145 once daily and should be taken daily not  if needed for promotion of healthy bowel movement and discomfort decreased.  Recheck in few months to ensure doing well.    Orders:  -     linaCLOtide (LINZESS) 145 mcg Cap capsule; Take 1 capsule (145 mcg total) by mouth before breakfast.  Dispense: 30 capsule; Refill: 2  -     linaCLOtide (LINZESS) 72 mcg Cap capsule; Take 1 capsule (72 mcg total) by mouth before breakfast.  Dispense: 8 capsule; Refill: 0    5. Diabetes mellitus without complication    6. Mild persistent asthma without complication  -     fluticasone-umeclidin-vilanter (TRELEGY ELLIPTA) 200-62.5-25 mcg inhaler; Inhale 1 puff into the lungs once daily.  Dispense: 1 each; Refill: 3    7. Hyperlipidemia, unspecified hyperlipidemia type  -     Lipid Panel    8. Inflammation around joint  -     Sedimentation Rate       Follow up in about 3 months (around 9/27/2023).          Discussed the diagnosis, prognosis, plan of care, chronic nature of and indications for, risks and benefits of treatment for conditions.  Continue all medications as prescribed unless otherwise noted.   Call if patient develops other symptoms or if not better in 2-3 days and sooner if worse. Emphasized the importance of compliance with all recommendations, including medication use and timely follow up. Instructed to return as noted be or sooner if patient develops any other problems or if there are any other additional questions or concerns.

## 2023-06-27 NOTE — ASSESSMENT & PLAN NOTE
Longstanding history of irritable bowel with constipation has tried every over-the-counter methods but we will begin Linzess 145 once daily and should be taken daily not if needed for promotion of healthy bowel movement and discomfort decreased.  Recheck in few months to ensure doing well.

## 2023-06-27 NOTE — ASSESSMENT & PLAN NOTE
We will notify with lab and increase module row from 10-12.5.  Doing well except for the constipation goal is to have fasting glucose between 70 and 120 and postprandial less than 140.  Continue healthy eating patterns notify any trouble return in 3 months unless any issues.

## 2023-06-27 NOTE — ASSESSMENT & PLAN NOTE
Slight callus to left 1st lateral MTP callus without ulceration.  Slight point tenderness at plantar surface of arch of foot beyond this normal foot exam discussed foot care for living with diabetes

## 2023-06-28 LAB
ALBUMIN SERPL-MCNC: 4.2 G/DL (ref 3.8–4.8)
ALBUMIN/CREAT UR: <6 MG/G CREAT (ref 0–29)
ALBUMIN/GLOB SERPL: 1.5 {RATIO} (ref 1.2–2.2)
ALP SERPL-CCNC: 94 IU/L (ref 44–121)
ALT SERPL-CCNC: 13 IU/L (ref 0–32)
AST SERPL-CCNC: 16 IU/L (ref 0–40)
BASOPHILS # BLD AUTO: 0.1 X10E3/UL (ref 0–0.2)
BASOPHILS NFR BLD AUTO: 1 %
BILIRUB SERPL-MCNC: 0.2 MG/DL (ref 0–1.2)
BUN SERPL-MCNC: 13 MG/DL (ref 6–24)
BUN/CREAT SERPL: 18 (ref 9–23)
CALCIUM SERPL-MCNC: 9.3 MG/DL (ref 8.7–10.2)
CHLORIDE SERPL-SCNC: 100 MMOL/L (ref 96–106)
CHOLEST SERPL-MCNC: 160 MG/DL (ref 100–199)
CO2 SERPL-SCNC: 23 MMOL/L (ref 20–29)
CREAT SERPL-MCNC: 0.72 MG/DL (ref 0.57–1)
CREAT UR-MCNC: 204.5 MG/DL
EOSINOPHIL # BLD AUTO: 0.1 X10E3/UL (ref 0–0.4)
EOSINOPHIL NFR BLD AUTO: 1 %
ERYTHROCYTE [DISTWIDTH] IN BLOOD BY AUTOMATED COUNT: 13.1 % (ref 11.7–15.4)
ERYTHROCYTE [SEDIMENTATION RATE] IN BLOOD BY WESTERGREN METHOD: 35 MM/HR (ref 0–32)
EST. GFR  (NO RACE VARIABLE): 102 ML/MIN/1.73
GLOBULIN SER CALC-MCNC: 2.8 G/DL (ref 1.5–4.5)
GLUCOSE SERPL-MCNC: 101 MG/DL (ref 70–99)
HBA1C MFR BLD: 5.1 % (ref 4.8–5.6)
HCT VFR BLD AUTO: 41.1 % (ref 34–46.6)
HDLC SERPL-MCNC: 50 MG/DL
HGB BLD-MCNC: 13.9 G/DL (ref 11.1–15.9)
IMM GRANULOCYTES NFR BLD AUTO: 0 %
LDLC SERPL CALC-MCNC: 80 MG/DL (ref 0–99)
LYMPHOCYTES # BLD AUTO: 2.9 X10E3/UL (ref 0.7–3.1)
LYMPHOCYTES NFR BLD AUTO: 29 %
MCH RBC QN AUTO: 30.1 PG (ref 26.6–33)
MCHC RBC AUTO-ENTMCNC: 33.8 G/DL (ref 31.5–35.7)
MCV RBC AUTO: 89 FL (ref 79–97)
MICROALBUMIN UR-MCNC: <12 UG/ML
MONOCYTES # BLD AUTO: 0.8 X10E3/UL (ref 0.1–0.9)
MONOCYTES NFR BLD AUTO: 8 %
NEUTROPHILS # BLD AUTO: 6.1 X10E3/UL (ref 1.4–7)
NEUTROPHILS NFR BLD AUTO: 61 %
PLATELET # BLD AUTO: 309 X10E3/UL (ref 150–450)
POTASSIUM SERPL-SCNC: 4.3 MMOL/L (ref 3.5–5.2)
PROT SERPL-MCNC: 7 G/DL (ref 6–8.5)
RBC # BLD AUTO: 4.62 X10E6/UL (ref 3.77–5.28)
SODIUM SERPL-SCNC: 137 MMOL/L (ref 134–144)
TRIGL SERPL-MCNC: 177 MG/DL (ref 0–149)
VLDLC SERPL CALC-MCNC: 30 MG/DL (ref 5–40)
WBC # BLD AUTO: 10.1 X10E3/UL (ref 3.4–10.8)

## 2023-06-29 ENCOUNTER — TELEPHONE (OUTPATIENT)
Dept: FAMILY MEDICINE | Facility: CLINIC | Age: 48
End: 2023-06-29
Payer: COMMERCIAL

## 2023-06-29 NOTE — TELEPHONE ENCOUNTER
----- Message from [facility] Lopez DNP sent at 6/28/2023  5:23 PM CDT -----  Notify CBC returned to normal, hemoglobin A1c excellent, at 5.1 from 5.9 whereas a year ago was 7.2. cholesterol still improving drastically LDL still at 80 goal less than 70 but this is much improved, triglycerides down slightly high but improving continue medications.  Sed rate still elevated at 35 but decreasing drastically continue current medications recheck hemoglobin A1c CBC sed FLP vitamin-D and magnesium in 6 months.  Will notify with results of vitamin D when available

## 2023-07-24 DIAGNOSIS — E11.9 TYPE 2 DIABETES MELLITUS WITHOUT COMPLICATION, UNSPECIFIED WHETHER LONG TERM INSULIN USE: ICD-10-CM

## 2023-07-24 DIAGNOSIS — L70.0 ACNE VULGARIS: Primary | ICD-10-CM

## 2023-07-24 RX ORDER — TRETINOIN 0.5 MG/G
CREAM TOPICAL
Qty: 10 G | Refills: 1 | Status: SHIPPED | OUTPATIENT
Start: 2023-07-24 | End: 2023-12-21

## 2023-07-24 RX ORDER — SYRINGE WITH NEEDLE, 1 ML 25GX5/8"
SYRINGE, EMPTY DISPOSABLE MISCELLANEOUS
Qty: 2 EACH | Refills: 1 | Status: SHIPPED | OUTPATIENT
Start: 2023-07-24 | End: 2023-09-20

## 2023-08-21 DIAGNOSIS — E11.9 TYPE 2 DIABETES MELLITUS WITHOUT COMPLICATION, WITHOUT LONG-TERM CURRENT USE OF INSULIN: ICD-10-CM

## 2023-08-21 DIAGNOSIS — E11.9 DIABETES MELLITUS WITHOUT COMPLICATION: ICD-10-CM

## 2023-08-21 DIAGNOSIS — E11.9 TYPE 2 DIABETES MELLITUS WITHOUT COMPLICATION, UNSPECIFIED WHETHER LONG TERM INSULIN USE: ICD-10-CM

## 2023-08-21 RX ORDER — SYRINGE WITH NEEDLE, 1 ML 25GX5/8"
SYRINGE, EMPTY DISPOSABLE MISCELLANEOUS
Qty: 2 EACH | Refills: 1 | OUTPATIENT
Start: 2023-08-21

## 2023-08-21 RX ORDER — ERGOCALCIFEROL 1.25 MG/1
CAPSULE ORAL
Qty: 4 CAPSULE | Refills: 1 | OUTPATIENT
Start: 2023-08-21

## 2023-08-21 RX ORDER — FLASH GLUCOSE SENSOR
KIT MISCELLANEOUS
Refills: 4 | OUTPATIENT
Start: 2023-08-21

## 2023-08-22 RX ORDER — TIRZEPATIDE 12.5 MG/.5ML
12.5 INJECTION, SOLUTION SUBCUTANEOUS
Qty: 4 PEN | Refills: 2 | Status: SHIPPED | OUTPATIENT
Start: 2023-08-22 | End: 2023-09-25 | Stop reason: DRUGHIGH

## 2023-08-22 NOTE — TELEPHONE ENCOUNTER
Spoke with pt. Script was already sent to [facility] for a 30 day supply. Pt said that was ok. She will discuss a dosage increase at her next visit on 9/27.

## 2023-08-22 NOTE — TELEPHONE ENCOUNTER
----- Message from Nkechi Oconnell sent at 8/22/2023  8:07 AM CDT -----  Regarding: Call back      1.  Ernst Goldberg told her she can use [facility] for her Munjaro since they did not have any for her to refill her script. But she wants to stay with [address].  They also told her that she needed a PA. ??? I am not aware of any PA at this time (that does not mean there is not one)    2.  She also feels that she needs to go to the next higher dose.  She does not see any difference in her sugar levels (levels not changing).  Her next appt is on 9/27/23.      Call her back with direction on changing dose at [phone]

## 2023-09-25 ENCOUNTER — OFFICE VISIT (OUTPATIENT)
Dept: FAMILY MEDICINE | Facility: CLINIC | Age: 48
End: 2023-09-25
Payer: COMMERCIAL

## 2023-09-25 VITALS
OXYGEN SATURATION: 96 % | DIASTOLIC BLOOD PRESSURE: 70 MMHG | HEIGHT: 67 IN | WEIGHT: 224 LBS | BODY MASS INDEX: 35.16 KG/M2 | RESPIRATION RATE: 20 BRPM | TEMPERATURE: 98 F | SYSTOLIC BLOOD PRESSURE: 121 MMHG | HEART RATE: 86 BPM

## 2023-09-25 DIAGNOSIS — L20.82 FLEXURAL ECZEMA: ICD-10-CM

## 2023-09-25 DIAGNOSIS — E11.9 TYPE 2 DIABETES MELLITUS WITHOUT COMPLICATION, WITH LONG-TERM CURRENT USE OF INSULIN: Primary | ICD-10-CM

## 2023-09-25 DIAGNOSIS — R39.9 UTI SYMPTOMS: ICD-10-CM

## 2023-09-25 DIAGNOSIS — T78.40XD ALLERGY, SUBSEQUENT ENCOUNTER: ICD-10-CM

## 2023-09-25 DIAGNOSIS — I10 PRIMARY HYPERTENSION: ICD-10-CM

## 2023-09-25 DIAGNOSIS — J34.81 NON-ULCERATIVE NASAL MUCOSITIS: ICD-10-CM

## 2023-09-25 DIAGNOSIS — Z79.4 TYPE 2 DIABETES MELLITUS WITHOUT COMPLICATION, WITH LONG-TERM CURRENT USE OF INSULIN: Primary | ICD-10-CM

## 2023-09-25 DIAGNOSIS — J30.0 VASOMOTOR RHINITIS: ICD-10-CM

## 2023-09-25 DIAGNOSIS — N30.00 ACUTE CYSTITIS WITHOUT HEMATURIA: ICD-10-CM

## 2023-09-25 LAB
BILIRUB SERPL-MCNC: NEGATIVE MG/DL
BLOOD URINE, POC: NEGATIVE
CLARITY, POC UA: CLEAR
COLOR, POC UA: YELLOW
GLUCOSE SERPL-MCNC: 112 MG/DL (ref 70–110)
GLUCOSE UR QL STRIP: NEGATIVE
KETONES UR QL STRIP: NEGATIVE
LEUKOCYTE ESTERASE URINE, POC: NORMAL
NITRITE, POC UA: NEGATIVE
PH, POC UA: 6
PROTEIN, POC: NEGATIVE
SPECIFIC GRAVITY, POC UA: 1.01
UROBILINOGEN, POC UA: 0.2

## 2023-09-25 PROCEDURE — 3044F PR MOST RECENT HEMOGLOBIN A1C LEVEL <7.0%: ICD-10-PCS | Mod: CPTII,,, | Performed by: NURSE PRACTITIONER

## 2023-09-25 PROCEDURE — 4010F PR ACE/ARB THEARPY RXD/TAKEN: ICD-10-PCS | Mod: CPTII,,, | Performed by: NURSE PRACTITIONER

## 2023-09-25 PROCEDURE — 2023F PR DILATED RETINAL EXAM W/O EVID OF RETINOPATHY: ICD-10-PCS | Mod: CPTII,,, | Performed by: NURSE PRACTITIONER

## 2023-09-25 PROCEDURE — 99214 OFFICE O/P EST MOD 30 MIN: CPT | Mod: ,,, | Performed by: NURSE PRACTITIONER

## 2023-09-25 PROCEDURE — 81002 POCT URINE DIPSTICK WITHOUT MICROSCOPE: ICD-10-PCS | Mod: ,,, | Performed by: NURSE PRACTITIONER

## 2023-09-25 PROCEDURE — 3066F NEPHROPATHY DOC TX: CPT | Mod: CPTII,,, | Performed by: NURSE PRACTITIONER

## 2023-09-25 PROCEDURE — 3061F NEG MICROALBUMINURIA REV: CPT | Mod: CPTII,,, | Performed by: NURSE PRACTITIONER

## 2023-09-25 PROCEDURE — 99214 PR OFFICE/OUTPT VISIT, EST, LEVL IV, 30-39 MIN: ICD-10-PCS | Mod: ,,, | Performed by: NURSE PRACTITIONER

## 2023-09-25 PROCEDURE — 3074F SYST BP LT 130 MM HG: CPT | Mod: CPTII,,, | Performed by: NURSE PRACTITIONER

## 2023-09-25 PROCEDURE — 3078F PR MOST RECENT DIASTOLIC BLOOD PRESSURE < 80 MM HG: ICD-10-PCS | Mod: CPTII,,, | Performed by: NURSE PRACTITIONER

## 2023-09-25 PROCEDURE — 1159F PR MEDICATION LIST DOCUMENTED IN MEDICAL RECORD: ICD-10-PCS | Mod: CPTII,,, | Performed by: NURSE PRACTITIONER

## 2023-09-25 PROCEDURE — 4010F ACE/ARB THERAPY RXD/TAKEN: CPT | Mod: CPTII,,, | Performed by: NURSE PRACTITIONER

## 2023-09-25 PROCEDURE — 3044F HG A1C LEVEL LT 7.0%: CPT | Mod: CPTII,,, | Performed by: NURSE PRACTITIONER

## 2023-09-25 PROCEDURE — 1160F RVW MEDS BY RX/DR IN RCRD: CPT | Mod: CPTII,,, | Performed by: NURSE PRACTITIONER

## 2023-09-25 PROCEDURE — 3078F DIAST BP <80 MM HG: CPT | Mod: CPTII,,, | Performed by: NURSE PRACTITIONER

## 2023-09-25 PROCEDURE — 3066F PR DOCUMENTATION OF TREATMENT FOR NEPHROPATHY: ICD-10-PCS | Mod: CPTII,,, | Performed by: NURSE PRACTITIONER

## 2023-09-25 PROCEDURE — 3074F PR MOST RECENT SYSTOLIC BLOOD PRESSURE < 130 MM HG: ICD-10-PCS | Mod: CPTII,,, | Performed by: NURSE PRACTITIONER

## 2023-09-25 PROCEDURE — 82962 POCT GLUCOSE, HAND-HELD DEVICE: ICD-10-PCS | Mod: ,,, | Performed by: NURSE PRACTITIONER

## 2023-09-25 PROCEDURE — 3061F PR NEG MICROALBUMINURIA RESULT DOCUMENTED/REVIEW: ICD-10-PCS | Mod: CPTII,,, | Performed by: NURSE PRACTITIONER

## 2023-09-25 PROCEDURE — 82962 GLUCOSE BLOOD TEST: CPT | Mod: ,,, | Performed by: NURSE PRACTITIONER

## 2023-09-25 PROCEDURE — 81002 URINALYSIS NONAUTO W/O SCOPE: CPT | Mod: ,,, | Performed by: NURSE PRACTITIONER

## 2023-09-25 PROCEDURE — 2023F DILAT RTA XM W/O RTNOPTHY: CPT | Mod: CPTII,,, | Performed by: NURSE PRACTITIONER

## 2023-09-25 PROCEDURE — 1160F PR REVIEW ALL MEDS BY PRESCRIBER/CLIN PHARMACIST DOCUMENTED: ICD-10-PCS | Mod: CPTII,,, | Performed by: NURSE PRACTITIONER

## 2023-09-25 PROCEDURE — 1159F MED LIST DOCD IN RCRD: CPT | Mod: CPTII,,, | Performed by: NURSE PRACTITIONER

## 2023-09-25 PROCEDURE — 3008F PR BODY MASS INDEX (BMI) DOCUMENTED: ICD-10-PCS | Mod: CPTII,,, | Performed by: NURSE PRACTITIONER

## 2023-09-25 PROCEDURE — 3008F BODY MASS INDEX DOCD: CPT | Mod: CPTII,,, | Performed by: NURSE PRACTITIONER

## 2023-09-25 RX ORDER — MUPIROCIN 20 MG/G
OINTMENT TOPICAL 3 TIMES DAILY
Qty: 22 G | Refills: 1 | Status: SHIPPED | OUTPATIENT
Start: 2023-09-25 | End: 2023-10-19

## 2023-09-25 RX ORDER — LOSARTAN POTASSIUM 50 MG/1
50 TABLET ORAL NIGHTLY
Qty: 90 TABLET | Refills: 1 | Status: SHIPPED | OUTPATIENT
Start: 2023-09-25 | End: 2024-01-23 | Stop reason: SDUPTHER

## 2023-09-25 RX ORDER — MONTELUKAST SODIUM 10 MG/1
10 TABLET ORAL NIGHTLY
Qty: 90 TABLET | Refills: 1 | Status: SHIPPED | OUTPATIENT
Start: 2023-09-25 | End: 2024-01-23 | Stop reason: SDUPTHER

## 2023-09-25 RX ORDER — CETIRIZINE HYDROCHLORIDE 10 MG/1
10 TABLET ORAL NIGHTLY
Qty: 90 TABLET | Refills: 1 | Status: SHIPPED | OUTPATIENT
Start: 2023-09-25 | End: 2024-01-23 | Stop reason: SDUPTHER

## 2023-09-25 RX ORDER — NITROFURANTOIN 25; 75 MG/1; MG/1
100 CAPSULE ORAL 2 TIMES DAILY
Qty: 14 CAPSULE | Refills: 0 | Status: SHIPPED | OUTPATIENT
Start: 2023-09-25 | End: 2023-10-02

## 2023-09-25 RX ORDER — BETAMETHASONE VALERATE 1.2 MG/G
CREAM TOPICAL 2 TIMES DAILY
Qty: 45 G | Refills: 1 | Status: SHIPPED | OUTPATIENT
Start: 2023-09-25 | End: 2023-11-20

## 2023-09-25 NOTE — PROGRESS NOTES
Subjective:       Patient ID: Sagrario Waller is a 48 y.o. female.    Chief Complaint: Follow-up (5 mth f/u on dm. Also co urgency and frequent urination. )    Patient is here for follow-up with her diabetes but she reports that for a short time she was required to wear masks with work and her oxygen was dropped down to 87 even at rest and she felt extremely short of breath.  It is much better without it normally she is taking her her asthma and allergy medicines and finds that they do work reasonably well but intermittently she will still have nasal sores that developed and she is wearing her 0 2 with her CPAP at least 8 hours with a nasal pillow most nights.  Her blood sugar has been running between 100 to 150s.  She is tolerating her medication without nausea. Feeling frequency and urgency for last 10 days.     Follow-up  Pertinent negatives include no chest pain, congestion, headaches or vertigo.     Review of Systems   Constitutional:  Negative for activity change and appetite change.   HENT:  Negative for nasal congestion, trouble swallowing and voice change.    Eyes: Negative.  Negative for visual disturbance.   Respiratory: Negative.  Negative for chest tightness, shortness of breath and wheezing.    Cardiovascular:  Negative for chest pain, palpitations and leg swelling.   Endocrine: Negative for cold intolerance, heat intolerance and polyuria.   Genitourinary:  Positive for dysuria, frequency, nocturia and urgency. Negative for bladder incontinence, difficulty urinating and flank pain.   Allergic/Immunologic: Negative for environmental allergies and food allergies.   Neurological:  Negative for vertigo, tremors, seizures, syncope, light-headedness, headaches and coordination difficulties.   Hematological:  Negative for adenopathy. Does not bruise/bleed easily.   Psychiatric/Behavioral:  Negative for agitation, sleep disturbance and suicidal ideas.          Objective:      Vitals:    09/25/23 1430   BP:  "121/70   Pulse: 86   Resp: 20   Temp: 97.8 °F (36.6 °C)   SpO2: 96%   Weight: 101.6 kg (224 lb)   Height: 5' 7" (1.702 m)       Physical Exam  Vitals and nursing note reviewed.   Constitutional:       General: She is not in acute distress.     Appearance: She is not ill-appearing.   HENT:      Head: Normocephalic and atraumatic.      Nose: Rhinorrhea present.      Mouth/Throat:      Mouth: Mucous membranes are moist.      Pharynx: Oropharynx is clear.   Eyes:      General: No scleral icterus.     Extraocular Movements: Extraocular movements intact.      Conjunctiva/sclera: Conjunctivae normal.      Pupils: Pupils are equal, round, and reactive to light.   Neck:      Vascular: No carotid bruit.   Cardiovascular:      Rate and Rhythm: Normal rate and regular rhythm.      Heart sounds: No murmur heard.     No friction rub. No gallop.   Pulmonary:      Effort: Pulmonary effort is normal. No respiratory distress.      Breath sounds: Normal breath sounds. No wheezing, rhonchi or rales.   Abdominal:      General: Abdomen is flat. Bowel sounds are normal. There is no distension.      Palpations: Abdomen is soft. There is no mass.      Tenderness: There is no abdominal tenderness.   Musculoskeletal:         General: Normal range of motion.      Cervical back: Normal range of motion and neck supple.        Feet:    Feet:      Comments: callus  Skin:     General: Skin is warm and dry.   Neurological:      General: No focal deficit present.      Mental Status: She is alert.   Psychiatric:         Mood and Affect: Mood normal.         Assessment/Plan:     1. Type 2 diabetes mellitus without complication, with long-term current use of insulin  Overview:  Lost 15# since starting Mounjaro, increase to 10 mg if doing well in one month can change to 3 months supply    Assessment & Plan:  Increase mounjaro to 15 mg weekly. Blood sugar remaining stable, but weight loss stalled.      Orders:  -     POCT Glucose, Hand-Held Device  -     " tirzepatide 15 mg/0.5 mL PnIj; Inject 15 mg into the skin every 7 days.  Dispense: 4 pen ; Refill: 2    2. UTI symptoms  -     POCT urine dipstick without microscope  -     Urine culture  -     nitrofurantoin, macrocrystal-monohydrate, (MACROBID) 100 MG capsule; Take 1 capsule (100 mg total) by mouth 2 (two) times daily. for 7 days  Dispense: 14 capsule; Refill: 0    3. Allergy, subsequent encounter  -     montelukast (SINGULAIR) 10 mg tablet; Take 1 tablet (10 mg total) by mouth every evening.  Dispense: 90 tablet; Refill: 1    4. Vasomotor rhinitis  Assessment & Plan:  Continue singulair 10 mg with zyrtec 10 mg.      Orders:  -     cetirizine (ZYRTEC) 10 MG tablet; Take 1 tablet (10 mg total) by mouth every evening.  Dispense: 90 tablet; Refill: 1    5. Primary hypertension  Assessment & Plan:  Cozaar 50 mg daily, controlled.     Orders:  -     losartan (COZAAR) 50 MG tablet; Take 1 tablet (50 mg total) by mouth every evening.  Dispense: 90 tablet; Refill: 1    6. Flexural eczema  Assessment & Plan:  Try betamethasone cream once daily to rash    Orders:  -     betamethasone valerate 0.1% (VALISONE) 0.1 % Crea; Apply topically 2 (two) times daily.  Dispense: 45 g; Refill: 1    7. Non-ulcerative nasal mucositis  Assessment & Plan:  bactroban 3 times daily.     Orders:  -     mupirocin (BACTROBAN) 2 % ointment; Apply topically 3 (three) times daily.  Dispense: 22 g; Refill: 1    8. Acute cystitis without hematuria  Assessment & Plan:  Microbid twice daily for 7 days.          Follow up in about 3 months (around 12/25/2023).          Discussed the diagnosis, prognosis, plan of care, chronic nature of and indications for, risks and benefits of treatment for conditions.  Continue all medications as prescribed unless otherwise noted.   Call if patient develops other symptoms or if not better in 2-3 days and sooner if worse. Emphasized the importance of compliance with all recommendations, including medication use and  timely follow up. Instructed to return as noted be or sooner if patient develops any other problems or if there are any other additional questions or concerns.

## 2023-09-28 LAB
BACTERIA UR CULT: NORMAL
BACTERIA UR CULT: NORMAL

## 2023-10-04 ENCOUNTER — TELEPHONE (OUTPATIENT)
Dept: FAMILY MEDICINE | Facility: CLINIC | Age: 48
End: 2023-10-04
Payer: COMMERCIAL

## 2023-10-04 DIAGNOSIS — R92.8 ABNORMAL MAMMOGRAM OF LEFT BREAST: Primary | ICD-10-CM

## 2023-10-04 NOTE — TELEPHONE ENCOUNTER
----- Message from Cary Peng sent at 10/4/2023  1:17 PM CDT -----  Patient [facility] that [facility] was time to schedule her 6 month ultrasound on her breast.  The orders were listed at the bottom of her last report from 6 months ago.      Please send order

## 2023-10-19 ENCOUNTER — TELEPHONE (OUTPATIENT)
Dept: FAMILY MEDICINE | Facility: CLINIC | Age: 48
End: 2023-10-19
Payer: COMMERCIAL

## 2023-10-19 DIAGNOSIS — E55.9 VITAMIN D DEFICIENCY: Primary | ICD-10-CM

## 2023-10-19 DIAGNOSIS — E11.9 DIABETES MELLITUS WITHOUT COMPLICATION: ICD-10-CM

## 2023-10-19 DIAGNOSIS — E11.9 TYPE 2 DIABETES MELLITUS WITHOUT COMPLICATION, UNSPECIFIED WHETHER LONG TERM INSULIN USE: ICD-10-CM

## 2023-10-19 DIAGNOSIS — J34.81 NON-ULCERATIVE NASAL MUCOSITIS: ICD-10-CM

## 2023-10-19 RX ORDER — FLASH GLUCOSE SENSOR
KIT MISCELLANEOUS
Qty: 2 KIT | Refills: 4 | Status: SHIPPED | OUTPATIENT
Start: 2023-10-19

## 2023-10-19 RX ORDER — ERGOCALCIFEROL 1.25 MG/1
CAPSULE ORAL
Qty: 4 CAPSULE | Refills: 1 | Status: SHIPPED | OUTPATIENT
Start: 2023-10-19 | End: 2024-01-23 | Stop reason: SDUPTHER

## 2023-10-19 RX ORDER — MUPIROCIN 20 MG/G
OINTMENT TOPICAL
Qty: 22 G | Refills: 1 | Status: SHIPPED | OUTPATIENT
Start: 2023-10-19 | End: 2024-01-23 | Stop reason: SDUPTHER

## 2023-10-19 RX ORDER — SYRINGE WITH NEEDLE, 1 ML 25GX5/8"
SYRINGE, EMPTY DISPOSABLE MISCELLANEOUS
Qty: 2 EACH | Refills: 1 | Status: SHIPPED | OUTPATIENT
Start: 2023-10-19

## 2023-10-19 NOTE — TELEPHONE ENCOUNTER
----- Message from Nkechi Oconnell sent at 10/19/2023  1:50 PM CDT -----  Regarding: Med refill  She needs a refill on the [facility] Monitor called in.      [address]

## 2023-10-24 ENCOUNTER — TELEPHONE (OUTPATIENT)
Dept: FAMILY MEDICINE | Facility: CLINIC | Age: 48
End: 2023-10-24
Payer: COMMERCIAL

## 2023-10-24 NOTE — TELEPHONE ENCOUNTER
----- Message from Blanquita Meraz sent at 10/24/2023  2:48 PM CDT -----  Regarding: DAVID PATIENT FREESTYLE MAHAD PA DENIAL  PATIENT MUST ALSO BE USING INSULIN TO BE ELIGIBLE FOR FREE STYLE MAHAD 14 DAY KIT.

## 2023-10-24 NOTE — TELEPHONE ENCOUNTER
Pt was called. States that she pays out of pocked $35. Each time. States that she just got it filled. So doesn't need it.

## 2023-12-21 DIAGNOSIS — L70.0 ACNE VULGARIS: ICD-10-CM

## 2023-12-21 RX ORDER — TRETINOIN 0.5 MG/G
CREAM TOPICAL
Qty: 20 G | Refills: 1 | Status: SHIPPED | OUTPATIENT
Start: 2023-12-21

## 2024-01-11 ENCOUNTER — TELEPHONE (OUTPATIENT)
Dept: FAMILY MEDICINE | Facility: CLINIC | Age: 49
End: 2024-01-11
Payer: COMMERCIAL

## 2024-01-11 NOTE — TELEPHONE ENCOUNTER
----- Message from [facility] Lopez DNP sent at 1/10/2024  4:57 PM CST -----  Scheduled follow-up with the patient normal WBC, low albumin is she having any swelling again?  Less than 70 and triglycerides and needs cardiac protection with Statin with DM risk. (Crestor 10 mg) Glucose up to 6.2, is this taking medication regularly? Mounjaro 15, may add farxiga 5 for renal protection. Vitamin d still ltherapeutic at 34, how often is she actually taking vitamin D 50,000? Recheck cmp, flp, hgb A1c, vitamin D, urine micro in 6 months

## 2024-01-23 ENCOUNTER — TELEPHONE (OUTPATIENT)
Dept: FAMILY MEDICINE | Facility: CLINIC | Age: 49
End: 2024-01-23

## 2024-01-23 ENCOUNTER — OFFICE VISIT (OUTPATIENT)
Dept: FAMILY MEDICINE | Facility: CLINIC | Age: 49
End: 2024-01-23
Payer: COMMERCIAL

## 2024-01-23 VITALS
BODY MASS INDEX: 39.39 KG/M2 | HEIGHT: 67 IN | OXYGEN SATURATION: 97 % | WEIGHT: 251 LBS | SYSTOLIC BLOOD PRESSURE: 128 MMHG | HEART RATE: 90 BPM | TEMPERATURE: 98 F | DIASTOLIC BLOOD PRESSURE: 71 MMHG | RESPIRATION RATE: 20 BRPM

## 2024-01-23 DIAGNOSIS — J34.81 NON-ULCERATIVE NASAL MUCOSITIS: ICD-10-CM

## 2024-01-23 DIAGNOSIS — T78.40XD ALLERGY, SUBSEQUENT ENCOUNTER: ICD-10-CM

## 2024-01-23 DIAGNOSIS — J30.0 VASOMOTOR RHINITIS: ICD-10-CM

## 2024-01-23 DIAGNOSIS — K21.9 GASTROESOPHAGEAL REFLUX DISEASE WITHOUT ESOPHAGITIS: ICD-10-CM

## 2024-01-23 DIAGNOSIS — M79.675 PAIN DUE TO ONYCHOMYCOSIS OF TOENAIL OF LEFT FOOT: ICD-10-CM

## 2024-01-23 DIAGNOSIS — M19.90 ARTHRITIS: ICD-10-CM

## 2024-01-23 DIAGNOSIS — J45.20 MILD INTERMITTENT ASTHMA WITHOUT COMPLICATION: ICD-10-CM

## 2024-01-23 DIAGNOSIS — E55.9 VITAMIN D DEFICIENCY: ICD-10-CM

## 2024-01-23 DIAGNOSIS — E78.5 HYPERLIPIDEMIA LDL GOAL <70: Primary | ICD-10-CM

## 2024-01-23 DIAGNOSIS — B35.1 PAIN DUE TO ONYCHOMYCOSIS OF TOENAIL OF LEFT FOOT: ICD-10-CM

## 2024-01-23 DIAGNOSIS — I10 PRIMARY HYPERTENSION: ICD-10-CM

## 2024-01-23 DIAGNOSIS — J45.30 MILD PERSISTENT ASTHMA WITHOUT COMPLICATION: ICD-10-CM

## 2024-01-23 DIAGNOSIS — E11.9 TYPE 2 DIABETES MELLITUS WITHOUT COMPLICATION, WITHOUT LONG-TERM CURRENT USE OF INSULIN: ICD-10-CM

## 2024-01-23 DIAGNOSIS — H00.012 HORDEOLUM EXTERNUM OF RIGHT LOWER EYELID: ICD-10-CM

## 2024-01-23 DIAGNOSIS — D51.8 VITAMIN B12 DEFICIENCY (DIETARY) ANEMIA: ICD-10-CM

## 2024-01-23 PROBLEM — H00.019 STYE EXTERNAL: Status: ACTIVE | Noted: 2024-01-23

## 2024-01-23 LAB — GLUCOSE SERPL-MCNC: 134 MG/DL (ref 70–110)

## 2024-01-23 PROCEDURE — 99214 OFFICE O/P EST MOD 30 MIN: CPT | Mod: ,,, | Performed by: NURSE PRACTITIONER

## 2024-01-23 PROCEDURE — 4010F ACE/ARB THERAPY RXD/TAKEN: CPT | Mod: CPTII,,, | Performed by: NURSE PRACTITIONER

## 2024-01-23 PROCEDURE — 82962 GLUCOSE BLOOD TEST: CPT | Mod: ,,, | Performed by: NURSE PRACTITIONER

## 2024-01-23 PROCEDURE — 1159F MED LIST DOCD IN RCRD: CPT | Mod: CPTII,,, | Performed by: NURSE PRACTITIONER

## 2024-01-23 PROCEDURE — 3074F SYST BP LT 130 MM HG: CPT | Mod: CPTII,,, | Performed by: NURSE PRACTITIONER

## 2024-01-23 PROCEDURE — 3078F DIAST BP <80 MM HG: CPT | Mod: CPTII,,, | Performed by: NURSE PRACTITIONER

## 2024-01-23 PROCEDURE — 3044F HG A1C LEVEL LT 7.0%: CPT | Mod: CPTII,,, | Performed by: NURSE PRACTITIONER

## 2024-01-23 PROCEDURE — 1160F RVW MEDS BY RX/DR IN RCRD: CPT | Mod: CPTII,,, | Performed by: NURSE PRACTITIONER

## 2024-01-23 PROCEDURE — 3008F BODY MASS INDEX DOCD: CPT | Mod: CPTII,,, | Performed by: NURSE PRACTITIONER

## 2024-01-23 RX ORDER — AZELASTINE 1 MG/ML
SPRAY, METERED NASAL
Qty: 30 ML | Refills: 2 | Status: SHIPPED | OUTPATIENT
Start: 2024-01-23 | End: 2024-04-08 | Stop reason: SDUPTHER

## 2024-01-23 RX ORDER — FLUTICASONE PROPIONATE 50 MCG
1 SPRAY, SUSPENSION (ML) NASAL 2 TIMES DAILY
Qty: 15.8 ML | Refills: 3 | Status: SHIPPED | OUTPATIENT
Start: 2024-01-23

## 2024-01-23 RX ORDER — FLUTICASONE FUROATE, UMECLIDINIUM BROMIDE AND VILANTEROL TRIFENATATE 200; 62.5; 25 UG/1; UG/1; UG/1
1 POWDER RESPIRATORY (INHALATION) DAILY
Qty: 1 EACH | Refills: 3 | Status: SHIPPED | OUTPATIENT
Start: 2024-01-23

## 2024-01-23 RX ORDER — MONTELUKAST SODIUM 10 MG/1
10 TABLET ORAL NIGHTLY
Qty: 90 TABLET | Refills: 1 | Status: SHIPPED | OUTPATIENT
Start: 2024-01-23

## 2024-01-23 RX ORDER — MUPIROCIN 20 MG/G
OINTMENT TOPICAL
Qty: 22 G | Refills: 1 | Status: SHIPPED | OUTPATIENT
Start: 2024-01-23

## 2024-01-23 RX ORDER — LOSARTAN POTASSIUM 50 MG/1
50 TABLET ORAL NIGHTLY
Qty: 90 TABLET | Refills: 1 | Status: SHIPPED | OUTPATIENT
Start: 2024-01-23 | End: 2024-04-08 | Stop reason: SDUPTHER

## 2024-01-23 RX ORDER — DEXTROAMPHETAMINE SACCHARATE, AMPHETAMINE ASPARTATE, DEXTROAMPHETAMINE SULFATE AND AMPHETAMINE SULFATE 7.5; 7.5; 7.5; 7.5 MG/1; MG/1; MG/1; MG/1
1 TABLET ORAL 2 TIMES DAILY
COMMUNITY
Start: 2023-12-27

## 2024-01-23 RX ORDER — CYANOCOBALAMIN 1000 UG/ML
1000 INJECTION, SOLUTION INTRAMUSCULAR; SUBCUTANEOUS
Qty: 10 ML | Refills: 1 | Status: SHIPPED | OUTPATIENT
Start: 2024-01-23 | End: 2024-02-27

## 2024-01-23 RX ORDER — CIPROFLOXACIN HYDROCHLORIDE 3 MG/ML
1 SOLUTION/ DROPS OPHTHALMIC
Qty: 5 ML | Refills: 0 | Status: SHIPPED | OUTPATIENT
Start: 2024-01-23 | End: 2024-02-21

## 2024-01-23 RX ORDER — ROSUVASTATIN CALCIUM 10 MG/1
10 TABLET, COATED ORAL NIGHTLY
Qty: 90 TABLET | Refills: 1 | Status: SHIPPED | OUTPATIENT
Start: 2024-01-23

## 2024-01-23 RX ORDER — CETIRIZINE HYDROCHLORIDE 10 MG/1
10 TABLET ORAL NIGHTLY
Qty: 90 TABLET | Refills: 1 | Status: SHIPPED | OUTPATIENT
Start: 2024-01-23 | End: 2024-03-04 | Stop reason: SDUPTHER

## 2024-01-23 RX ORDER — IBUPROFEN AND FAMOTIDINE 800; 26.6 MG/1; MG/1
1 TABLET, COATED ORAL DAILY PRN
Qty: 30 TABLET | Refills: 3 | Status: SHIPPED | OUTPATIENT
Start: 2024-01-23 | End: 2024-05-29

## 2024-01-23 RX ORDER — ERGOCALCIFEROL 1.25 MG/1
CAPSULE ORAL
Qty: 4 CAPSULE | Refills: 1 | Status: SHIPPED | OUTPATIENT
Start: 2024-01-23 | End: 2024-02-21 | Stop reason: SDUPTHER

## 2024-01-23 NOTE — ASSESSMENT & PLAN NOTE
Trelegy 200/62.5 daily. The current medical regimen is effective;  continue present plan and medications.

## 2024-01-23 NOTE — ASSESSMENT & PLAN NOTE
Right lower lid to wash with half-strength baby shampoo twice daily and warm compress then use antibiotic eyedrops but notify any changes or problems

## 2024-01-23 NOTE — TELEPHONE ENCOUNTER
----- Message from Roberta Corona LPN sent at 1/23/2024  1:13 PM CST -----  Regarding: FW: PA KOBEIAL    ----- Message -----  From: [facility] Lopez DNP  Sent: 1/23/2024  12:55 PM CST  To: Roberta Corona LPN  Subject: RE: PA DENIAL                                    Check if on [facility] plan and can pay cash otherwise try with pepcid if needed for arthritis.   ----- Message -----  From: Roberta Corona LPN  Sent: 1/23/2024  11:38 AM CST  To: [facility] Lopez DNP  Subject: FW: PA DENIAL                                      ----- Message -----  From: Blanquita Meraz  Sent: 1/23/2024  11:25 AM CST  To: [facility]  Subject: PA DENIAL                                        [employer] IS EXCLUDED FROM HER INS POLICY . NO COVERAGE / BENEFITS AVAILABLE

## 2024-01-23 NOTE — TELEPHONE ENCOUNTER
Called pt and she states that she spoke with tw already and they gave her a cash price for $30. States that she will pay that.

## 2024-01-23 NOTE — ASSESSMENT & PLAN NOTE
Crestor 10 mg qhs. Will notify with results of lab draw when available. Continue current treatment until results available.

## 2024-01-23 NOTE — ASSESSMENT & PLAN NOTE
Has been without mounjaro for for past 3 months. Didn't want to restart mounjaro and was tolerating 15 mg weekly and was well controlled. Now not checking as often as recommended. Restart with Mounjaro 7.5 for 3 weeks increase 10 for 3 mg then resume mounjaro 15 mg. .  Hemoglobin A1c   Date Value Ref Range Status   01/08/2024 6.2 (H) 4.8 - 5.6 % Final     Comment:              Prediabetes: 5.7 - 6.4           Diabetes: >6.4           Glycemic control for adults with diabetes: <7.0     06/27/2023 5.1 4.8 - 5.6 % Final     Comment:              Prediabetes: 5.7 - 6.4           Diabetes: >6.4           Glycemic control for adults with diabetes: <7.0     03/27/2023 5.9 (H) 4.8 - 5.6 % Final     Comment:              Prediabetes: 5.7 - 6.4           Diabetes: >6.4           Glycemic control for adults with diabetes: <7.0     05/03/2022 7.2 4.8 - 5.6    11/30/2021 6.6 (H) 4.8 - 5.6 % Final   07/12/2021 6.3 (H) 4.8 - 5.6 % Final

## 2024-01-23 NOTE — PROGRESS NOTES
"Subjective:       Patient ID: Sagrario Waller is a 48 y.o. female.    Chief Complaint: Follow-up (Review labs. Rt eye hurting and left second toe swollen.)    The patient returns for follow-up with her chronic conditions and has been off of the module row for the past 3 months.  She was experiencing difficulties with very extreme family situation and has not been resting well and has not had an opportunity to restart the module row.  Since this time she is gained 25 lb in his feeling much more fatigued than she had while she was taking the medications.  Additionally she needs refills on all of her medicines and had been off of the rosuvastatin because she felt like she was going to be able to control it with lifestyle.  She is using her asthma medicine and finds that it is doing well most of the time her right lower eye lid is becoming discomfort for with no drainage and point tenderness.  Her left 2nd toe at the surrounding the nail has been painful to touch with increased heat and redness      Review of Systems   Constitutional:  Positive for activity change, appetite change and fatigue.   HENT:  Positive for rhinorrhea.    Eyes:  Positive for redness.   Respiratory: Negative.     Cardiovascular: Negative.    Gastrointestinal: Negative.    Endocrine: Negative.    Genitourinary: Negative.    Integumentary:  Positive for color change.   Allergic/Immunologic: Positive for environmental allergies.   Neurological: Negative.    Hematological: Negative.    Psychiatric/Behavioral:  Positive for behavioral problems and sleep disturbance. The patient is nervous/anxious.          Objective:      Vitals:    01/23/24 0821   BP: 128/71   Pulse: 90   Resp: 20   Temp: 97.6 °F (36.4 °C)   SpO2: 97%   Weight: 113.9 kg (251 lb)   Height: 5' 7" (1.702 m)       Physical Exam  Vitals and nursing note reviewed.   Constitutional:       Appearance: Normal appearance.   HENT:      Head: Normocephalic.      Right Ear: Tympanic membrane " normal.      Left Ear: Tympanic membrane normal.      Nose: Rhinorrhea present.      Mouth/Throat:      Mouth: Mucous membranes are moist.      Pharynx: Oropharynx is clear.   Eyes:     Cardiovascular:      Pulses: Normal pulses.   Chest:   Breasts:     Right: Normal.      Left: Normal.   Musculoskeletal:      Cervical back: Normal range of motion.   Lymphadenopathy:      Upper Body:      Right upper body: No supraclavicular, axillary or pectoral adenopathy.      Left upper body: No supraclavicular, axillary or pectoral adenopathy.   Skin:     General: Skin is warm and dry.             Comments: Erythema without drainage   Neurological:      Mental Status: She is alert.         Assessment/Plan:     1. Hyperlipidemia LDL goal <70  Assessment & Plan:  Crestor 10 mg qhs. Will notify with results of lab draw when available. Continue current treatment until results available.         Orders:  -     rosuvastatin (CRESTOR) 10 MG tablet; Take 1 tablet (10 mg total) by mouth every evening.  Dispense: 90 tablet; Refill: 1    2. Type 2 diabetes mellitus without complication, without long-term current use of insulin  Overview:  Lost 15# since starting Mounjaro, increase to 10 mg if doing well in one month can change to 3 months supply. Will notify with results of lab draw when available. Continue current treatment until results available.       Assessment & Plan:  Has been without mounjaro for for past 3 months. Didn't want to restart mounjaro and was tolerating 15 mg weekly and was well controlled. Now not checking as often as recommended. Restart with Mounjaro 7.5 for 3 weeks increase 10 for 3 mg then resume mounjaro 15 mg. .  Hemoglobin A1c   Date Value Ref Range Status   01/08/2024 6.2 (H) 4.8 - 5.6 % Final     Comment:              Prediabetes: 5.7 - 6.4           Diabetes: >6.4           Glycemic control for adults with diabetes: <7.0     06/27/2023 5.1 4.8 - 5.6 % Final     Comment:              Prediabetes: 5.7 - 6.4            Diabetes: >6.4           Glycemic control for adults with diabetes: <7.0     03/27/2023 5.9 (H) 4.8 - 5.6 % Final     Comment:              Prediabetes: 5.7 - 6.4           Diabetes: >6.4           Glycemic control for adults with diabetes: <7.0     05/03/2022 7.2 4.8 - 5.6    11/30/2021 6.6 (H) 4.8 - 5.6 % Final   07/12/2021 6.3 (H) 4.8 - 5.6 % Final        Orders:  -     POCT Glucose, Hand-Held Device    3. Hordeolum externum of right lower eyelid  Assessment & Plan:  Right lower lid to wash with half-strength baby shampoo twice daily and warm compress then use antibiotic eyedrops but notify any changes or problems    Orders:  -     ciprofloxacin HCl (CILOXAN) 0.3 % ophthalmic solution; Place 1 drop into the right eye every 2 (two) hours.  Dispense: 5 mL; Refill: 0    4. Pain due to onychomycosis of toenail of left foot  Assessment & Plan:  Soak with epsom and apply bactroban ointment tid. Notify any changes or problems.     Orders:  -     mupirocin (BACTROBAN) 2 % ointment; APPLY TO AFFECTED AREA(S) 3 TIMES DAILY AS DIRECTED FOR INFECTION  Dispense: 22 g; Refill: 1    5. Primary hypertension  Assessment & Plan:  Cozaar 50 mg. The current medical regimen is effective;  continue present plan and medications.      Orders:  -     losartan (COZAAR) 50 MG tablet; Take 1 tablet (50 mg total) by mouth every evening.  Dispense: 90 tablet; Refill: 1    6. Mild intermittent asthma without complication  Assessment & Plan:  Trelegy 200/62.5 daily. The current medical regimen is effective;  continue present plan and medications.        7. Non-ulcerative nasal mucositis    8. Gastroesophageal reflux disease without esophagitis  Assessment & Plan:  No current symptoms since weight loss. Continue lifestyle changes      9. Vasomotor rhinitis  -     azelastine (ASTELIN) 137 mcg (0.1 %) nasal spray; SPRAY ONE(1) SPRAY IN EACH NOSTRIL TWICE DAILY FOR SINUS, ALLERGY, &/OR CONGESTION  Dispense: 30 mL; Refill: 2  -     cetirizine  (ZYRTEC) 10 MG tablet; Take 1 tablet (10 mg total) by mouth every evening.  Dispense: 90 tablet; Refill: 1  -     fluticasone propionate (FLONASE) 50 mcg/actuation nasal spray; 1 spray (50 mcg total) by Each Nostril route 2 (two) times daily.  Dispense: 15.8 mL; Refill: 3    10. Vitamin B12 deficiency (dietary) anemia  -     cyanocobalamin 1,000 mcg/mL injection; Inject 1 mL (1,000 mcg total) into the muscle every 14 (fourteen) days.  Dispense: 10 mL; Refill: 1    11. Vitamin D deficiency  -     ergocalciferol (ERGOCALCIFEROL) 50,000 unit Cap; TAKE ONE(1) CAP BY MOUTH ONCE WEEKLY ON THE SAME DAY(S) EACH WEEK FOR VITAMIN D  Dispense: 4 capsule; Refill: 1    12. Mild persistent asthma without complication  -     fluticasone-umeclidin-vilanter (TRELEGY ELLIPTA) 200-62.5-25 mcg inhaler; Inhale 1 puff into the lungs once daily.  Dispense: 1 each; Refill: 3    13. Allergy, subsequent encounter  -     montelukast (SINGULAIR) 10 mg tablet; Take 1 tablet (10 mg total) by mouth every evening.  Dispense: 90 tablet; Refill: 1    14. Arthritis  -     DUEXIS 800-26.6 mg Tab; Take 1 tablet by mouth daily as needed (pain).  Dispense: 30 tablet; Refill: 3       No follow-ups on file.          Discussed the diagnosis, prognosis, plan of care, chronic nature of and indications for, risks and benefits of treatment for conditions.  Continue all medications as prescribed unless otherwise noted.   Call if patient develops other symptoms or if not better in 2-3 days and sooner if worse. Emphasized the importance of compliance with all recommendations, including medication use and timely follow up. Instructed to return as noted be or sooner if patient develops any other problems or if there are any other additional questions or concerns.

## 2024-02-21 ENCOUNTER — OFFICE VISIT (OUTPATIENT)
Dept: FAMILY MEDICINE | Facility: CLINIC | Age: 49
End: 2024-02-21
Payer: COMMERCIAL

## 2024-02-21 VITALS
OXYGEN SATURATION: 99 % | HEIGHT: 67 IN | HEART RATE: 86 BPM | DIASTOLIC BLOOD PRESSURE: 71 MMHG | SYSTOLIC BLOOD PRESSURE: 119 MMHG | BODY MASS INDEX: 38.92 KG/M2 | WEIGHT: 248 LBS | RESPIRATION RATE: 20 BRPM | TEMPERATURE: 98 F

## 2024-02-21 DIAGNOSIS — I10 PRIMARY HYPERTENSION: ICD-10-CM

## 2024-02-21 DIAGNOSIS — E11.9 TYPE 2 DIABETES MELLITUS WITHOUT COMPLICATION, WITH LONG-TERM CURRENT USE OF INSULIN: ICD-10-CM

## 2024-02-21 DIAGNOSIS — B37.9 CANDIDIASIS: ICD-10-CM

## 2024-02-21 DIAGNOSIS — E11.9 TYPE 2 DIABETES MELLITUS WITHOUT COMPLICATION, WITHOUT LONG-TERM CURRENT USE OF INSULIN: ICD-10-CM

## 2024-02-21 DIAGNOSIS — J45.20 MILD INTERMITTENT ASTHMA WITHOUT COMPLICATION: ICD-10-CM

## 2024-02-21 DIAGNOSIS — K59.09 CHRONIC CONSTIPATION: ICD-10-CM

## 2024-02-21 DIAGNOSIS — J02.0 STREPTOCOCCAL PHARYNGITIS: Primary | ICD-10-CM

## 2024-02-21 DIAGNOSIS — E11.65 TYPE 2 DIABETES MELLITUS WITH HYPERGLYCEMIA, WITHOUT LONG-TERM CURRENT USE OF INSULIN: ICD-10-CM

## 2024-02-21 DIAGNOSIS — Z79.4 TYPE 2 DIABETES MELLITUS WITHOUT COMPLICATION, WITH LONG-TERM CURRENT USE OF INSULIN: ICD-10-CM

## 2024-02-21 PROBLEM — J02.9 PHARYNGITIS: Status: RESOLVED | Noted: 2023-03-28 | Resolved: 2024-02-21

## 2024-02-21 LAB
CTP QC/QA: YES
FLUAV AG NPH QL: NEGATIVE
FLUBV AG NPH QL: NEGATIVE
GLUCOSE SERPL-MCNC: 132 MG/DL (ref 70–110)
S PYO RRNA THROAT QL PROBE: POSITIVE
SARS-COV-2 AG RESP QL IA.RAPID: NEGATIVE

## 2024-02-21 PROCEDURE — 99214 OFFICE O/P EST MOD 30 MIN: CPT | Mod: 25,,, | Performed by: NURSE PRACTITIONER

## 2024-02-21 PROCEDURE — 3074F SYST BP LT 130 MM HG: CPT | Mod: CPTII,,, | Performed by: NURSE PRACTITIONER

## 2024-02-21 PROCEDURE — 3044F HG A1C LEVEL LT 7.0%: CPT | Mod: CPTII,,, | Performed by: NURSE PRACTITIONER

## 2024-02-21 PROCEDURE — 87880 STREP A ASSAY W/OPTIC: CPT | Mod: QW,,, | Performed by: NURSE PRACTITIONER

## 2024-02-21 PROCEDURE — 87400 INFLUENZA A/B EACH AG IA: CPT | Mod: 59,QW,, | Performed by: NURSE PRACTITIONER

## 2024-02-21 PROCEDURE — 82962 GLUCOSE BLOOD TEST: CPT | Mod: ,,, | Performed by: NURSE PRACTITIONER

## 2024-02-21 PROCEDURE — 1159F MED LIST DOCD IN RCRD: CPT | Mod: CPTII,,, | Performed by: NURSE PRACTITIONER

## 2024-02-21 PROCEDURE — 3008F BODY MASS INDEX DOCD: CPT | Mod: CPTII,,, | Performed by: NURSE PRACTITIONER

## 2024-02-21 PROCEDURE — 4010F ACE/ARB THERAPY RXD/TAKEN: CPT | Mod: CPTII,,, | Performed by: NURSE PRACTITIONER

## 2024-02-21 PROCEDURE — 3078F DIAST BP <80 MM HG: CPT | Mod: CPTII,,, | Performed by: NURSE PRACTITIONER

## 2024-02-21 PROCEDURE — 96372 THER/PROPH/DIAG INJ SC/IM: CPT | Mod: ,,, | Performed by: NURSE PRACTITIONER

## 2024-02-21 PROCEDURE — 1160F RVW MEDS BY RX/DR IN RCRD: CPT | Mod: CPTII,,, | Performed by: NURSE PRACTITIONER

## 2024-02-21 PROCEDURE — 87811 SARS-COV-2 COVID19 W/OPTIC: CPT | Mod: QW,,, | Performed by: NURSE PRACTITIONER

## 2024-02-21 RX ORDER — TIRZEPATIDE 15 MG/.5ML
INJECTION, SOLUTION SUBCUTANEOUS
Qty: 4 PEN | Refills: 2 | Status: SHIPPED | OUTPATIENT
Start: 2024-02-21 | End: 2024-03-12

## 2024-02-21 RX ORDER — AMOXICILLIN 875 MG/1
875 TABLET, FILM COATED ORAL EVERY 12 HOURS
Qty: 14 TABLET | Refills: 0 | Status: SHIPPED | OUTPATIENT
Start: 2024-02-21 | End: 2024-03-04 | Stop reason: ALTCHOICE

## 2024-02-21 RX ORDER — LACTULOSE 10 G/15ML
10 SOLUTION ORAL DAILY PRN
Qty: 480 ML | Refills: 1 | Status: SHIPPED | OUTPATIENT
Start: 2024-02-21

## 2024-02-21 RX ORDER — FLUCONAZOLE 150 MG/1
150 TABLET ORAL DAILY
Qty: 1 TABLET | Refills: 0 | Status: SHIPPED | OUTPATIENT
Start: 2024-02-21 | End: 2024-02-22

## 2024-02-21 RX ORDER — DEXAMETHASONE SODIUM PHOSPHATE 100 MG/10ML
10 INJECTION INTRAMUSCULAR; INTRAVENOUS
Status: COMPLETED | OUTPATIENT
Start: 2024-02-21 | End: 2024-02-21

## 2024-02-21 RX ADMIN — DEXAMETHASONE SODIUM PHOSPHATE 10 MG: 100 INJECTION INTRAMUSCULAR; INTRAVENOUS at 04:02

## 2024-02-21 NOTE — ASSESSMENT & PLAN NOTE
Lactulose mg p.o. q.d. if needed for constipation but do check blood sugar with this and notify if does not work has not taking the Linzess because was afraid that would have explosive diarrhea.

## 2024-02-21 NOTE — ASSESSMENT & PLAN NOTE
Amoxil 875 mg q12h for 7 days for infection. May use astelin and nasal spray for nasal congestion. Have rx for diflucan 150 if needed for yeast infection.

## 2024-02-21 NOTE — PROGRESS NOTES
"Subjective:       Patient ID: Sagrario Waller is a 48 y.o. female.    Chief Complaint: Nasal Congestion (Taking Bendaryl and allergy meds that are prescribed. ), Sore Throat (Symptoms started Friday night and have gotten worse. ), and Cough    Patient returns with very severe sore throat that started with a sinus drip on Friday but it has progressively been getting worse with more sinus congestion and for the past 2 days extremely sore throat.  She is also begun to notice that her asthma is becoming more congested and she had not been needing the Trelegy but would be willing to started again.  She was not running a fever but she definitely felt very poorly with chills body aches and headache.  She does also have constipation and was afraid to try the Linzess due to possible side effects and would like to try Chronulac.  She is back on her module row 10 mg to get it restarted and since she has started finds that her blood sugar is in much better control than it was prior to that.  She is also very frequent person for getting yeast infections anytime she takes a antibiotic and needs coverage.    Cough  Associated symptoms include a sore throat and wheezing. Pertinent negatives include no shortness of breath.     Review of Systems   Constitutional:  Positive for activity change and fatigue.   HENT:  Positive for sore throat.    Respiratory:  Positive for cough and wheezing. Negative for shortness of breath.    Gastrointestinal:  Positive for constipation.   All other systems reviewed and are negative.        Objective:      Vitals:    02/21/24 1517   BP: 119/71   Pulse: 86   Resp: 20   Temp: 98.2 °F (36.8 °C)   SpO2: 99%   Weight: 112.5 kg (248 lb)   Height: 5' 7" (1.702 m)       Physical Exam  Vitals and nursing note reviewed.   Constitutional:       Appearance: She is obese.   HENT:      Mouth/Throat:      Pharynx: Posterior oropharyngeal erythema present. No oropharyngeal exudate.   Cardiovascular:      Rate and " Rhythm: Normal rate and regular rhythm.      Pulses: Normal pulses.      Heart sounds: Normal heart sounds.   Pulmonary:      Effort: Pulmonary effort is normal.      Breath sounds: Normal breath sounds.   Abdominal:      General: Bowel sounds are normal.      Palpations: Abdomen is soft.   Musculoskeletal:         General: Normal range of motion.   Skin:     General: Skin is warm.   Neurological:      Mental Status: She is alert and oriented to person, place, and time.         Assessment/Plan:     1. Streptococcal pharyngitis  Assessment & Plan:  Amoxil 875 mg q12h for 7 days for infection. May use astelin and nasal spray for nasal congestion. Have rx for diflucan 150 if needed for yeast infection.     Orders:  -     POCT Influenza A/B  -     POCT rapid strep A  -     SARS Coronavirus 2 Antigen, POCT Manual Read    2. Type 2 diabetes mellitus without complication, without long-term current use of insulin  Overview:  Lost 15# since starting Mounjaro, increase to 10 mg if doing well in one month can change to 3 months supply. Will notify with results of lab draw when available. Continue current treatment until results available.       Orders:  -     POCT Glucose, Hand-Held Device    3. Primary hypertension  Assessment & Plan:  Losartan 50 mg daily. The current medical regimen is effective;  continue present plan and medications.        4. Mild intermittent asthma without complication  Assessment & Plan:  Restart trelegy 200/62.5/25 one puff daily and rinse mouth after use. Albuterol if needed. The current medical regimen is effective;  continue present plan and medications. Dexamethasone 6 mg IM.         5. Type 2 diabetes mellitus with hyperglycemia, without long-term current use of insulin  Overview:  Lost 15# since starting Mounjaro, increase to 10 mg if doing well in one month can change to 3 months supply. Will notify with results of lab draw when available. Continue current treatment until results available.          6. Chronic constipation  Assessment & Plan:  Lactulose mg p.o. q.d. if needed for constipation but do check blood sugar with this and notify if does not work has not taking the Linzess because was afraid that would have explosive diarrhea.      7. Candidiasis       No follow-ups on file.          Discussed the diagnosis, prognosis, plan of care, chronic nature of and indications for, risks and benefits of treatment for conditions.  Continue all medications as prescribed unless otherwise noted.   Call if patient develops other symptoms or if not better in 2-3 days and sooner if worse. Emphasized the importance of compliance with all recommendations, including medication use and timely follow up. Instructed to return as noted be or sooner if patient develops any other problems or if there are any other additional questions or concerns.

## 2024-02-21 NOTE — ASSESSMENT & PLAN NOTE
Restart trelegy 200/62.5/25 one puff daily and rinse mouth after use. Albuterol if needed. The current medical regimen is effective;  continue present plan and medications. Dexamethasone 6 mg IM.

## 2024-02-21 NOTE — ASSESSMENT & PLAN NOTE
Losartan 50 mg daily. The current medical regimen is effective;  continue present plan and medications.

## 2024-02-27 DIAGNOSIS — E55.9 VITAMIN D DEFICIENCY: Primary | ICD-10-CM

## 2024-02-27 DIAGNOSIS — D51.8 VITAMIN B12 DEFICIENCY (DIETARY) ANEMIA: ICD-10-CM

## 2024-02-27 RX ORDER — ERGOCALCIFEROL 1.25 MG/1
CAPSULE ORAL
Qty: 4 CAPSULE | Refills: 1 | Status: SHIPPED | OUTPATIENT
Start: 2024-02-27 | End: 2024-04-08 | Stop reason: ALTCHOICE

## 2024-02-27 RX ORDER — CYANOCOBALAMIN 1000 UG/ML
INJECTION, SOLUTION INTRAMUSCULAR; SUBCUTANEOUS
Qty: 2 ML | Refills: 1 | Status: SHIPPED | OUTPATIENT
Start: 2024-02-27 | End: 2024-05-29

## 2024-03-04 ENCOUNTER — OFFICE VISIT (OUTPATIENT)
Dept: FAMILY MEDICINE | Facility: CLINIC | Age: 49
End: 2024-03-04
Payer: COMMERCIAL

## 2024-03-04 DIAGNOSIS — J45.20 MILD INTERMITTENT ASTHMA WITHOUT COMPLICATION: ICD-10-CM

## 2024-03-04 DIAGNOSIS — J30.0 VASOMOTOR RHINITIS: ICD-10-CM

## 2024-03-04 DIAGNOSIS — I10 PRIMARY HYPERTENSION: ICD-10-CM

## 2024-03-04 DIAGNOSIS — K59.04 CHRONIC IDIOPATHIC CONSTIPATION: ICD-10-CM

## 2024-03-04 DIAGNOSIS — E11.65 TYPE 2 DIABETES MELLITUS WITH HYPERGLYCEMIA, WITHOUT LONG-TERM CURRENT USE OF INSULIN: Primary | ICD-10-CM

## 2024-03-04 PROCEDURE — 1160F RVW MEDS BY RX/DR IN RCRD: CPT | Mod: CPTII,,, | Performed by: NURSE PRACTITIONER

## 2024-03-04 PROCEDURE — 3044F HG A1C LEVEL LT 7.0%: CPT | Mod: CPTII,,, | Performed by: NURSE PRACTITIONER

## 2024-03-04 PROCEDURE — 99214 OFFICE O/P EST MOD 30 MIN: CPT | Mod: ,,, | Performed by: NURSE PRACTITIONER

## 2024-03-04 PROCEDURE — 4010F ACE/ARB THERAPY RXD/TAKEN: CPT | Mod: CPTII,,, | Performed by: NURSE PRACTITIONER

## 2024-03-04 PROCEDURE — 1159F MED LIST DOCD IN RCRD: CPT | Mod: CPTII,,, | Performed by: NURSE PRACTITIONER

## 2024-03-04 RX ORDER — CETIRIZINE HYDROCHLORIDE 10 MG/1
10 TABLET ORAL NIGHTLY
Qty: 90 TABLET | Refills: 1 | Status: SHIPPED | OUTPATIENT
Start: 2024-03-04 | End: 2024-04-08

## 2024-03-04 RX ORDER — ALBUTEROL SULFATE 90 UG/1
AEROSOL, METERED RESPIRATORY (INHALATION)
Qty: 6.7 G | Refills: 0 | Status: SHIPPED | OUTPATIENT
Start: 2024-03-04

## 2024-03-04 NOTE — ASSESSMENT & PLAN NOTE
Mounjaro 15 mg weekly.  Need continuous glucose monitor with hyperglycemia in the past..Will notify with results of lab draw when available.   Hemoglobin A1c   Date Value Ref Range Status   01/08/2024 6.2 (H) 4.8 - 5.6 % Final     Comment:              Prediabetes: 5.7 - 6.4           Diabetes: >6.4           Glycemic control for adults with diabetes: <7.0     06/27/2023 5.1 4.8 - 5.6 % Final     Comment:              Prediabetes: 5.7 - 6.4           Diabetes: >6.4           Glycemic control for adults with diabetes: <7.0     03/27/2023 5.9 (H) 4.8 - 5.6 % Final     Comment:              Prediabetes: 5.7 - 6.4           Diabetes: >6.4           Glycemic control for adults with diabetes: <7.0     05/03/2022 7.2 4.8 - 5.6    11/30/2021 6.6 (H) 4.8 - 5.6 % Final   07/12/2021 6.3 (H) 4.8 - 5.6 % Final    Currently controlled. But requires monitoring and continue low fat, low carb diet.

## 2024-03-04 NOTE — ASSESSMENT & PLAN NOTE
Lactulose helping symptoms of constipation. The current medical regimen is effective;  continue present plan and medications.

## 2024-03-04 NOTE — ASSESSMENT & PLAN NOTE
Cozaar 50 mg daily. The current medical regimen is effective;  continue present plan and medications.

## 2024-03-04 NOTE — ASSESSMENT & PLAN NOTE
Nasal irrigation, using astelin helping symptoms. Zyrtec 10 mg daily prn symptoms. The current medical regimen is effective;  continue present plan and medications.

## 2024-03-04 NOTE — PROGRESS NOTES
Subjective:       Patient ID: Sagrario Waller is a 48 y.o. female.    Chief Complaint: Sore Throat (F/u from strep. Pt states that she is now feeling better.)    Patient returns for follow-up with chronic conditions after restarting her maintenance medications for diabetes especially.  Her symptoms of the sore throat and upper respiratory have improved drastically she never did develop the bronchitis this time.  Completed her antibiotic in his taking her module row regularly now with her statin and does feel that it is improving her blood sugar is coming down staying less than 120 most of the time and she is tolerating the module row without any real GI upset.        Review of Systems   Constitutional:  Negative for activity change and appetite change.   HENT:  Positive for postnasal drip. Negative for nasal congestion, trouble swallowing and voice change.    Eyes: Negative.  Negative for visual disturbance.   Respiratory: Negative.  Negative for chest tightness, shortness of breath and wheezing.    Cardiovascular:  Negative for chest pain, palpitations and leg swelling.   Endocrine: Negative for cold intolerance, heat intolerance and polyuria.   Genitourinary:  Negative for bladder incontinence, difficulty urinating, flank pain and frequency.   Allergic/Immunologic: Negative for environmental allergies and food allergies.   Neurological:  Negative for vertigo, tremors, seizures, syncope, light-headedness, headaches and coordination difficulties.   Hematological:  Negative for adenopathy. Does not bruise/bleed easily.   Psychiatric/Behavioral:  Positive for sleep disturbance. Negative for agitation, dysphoric mood and suicidal ideas. The patient is not nervous/anxious.          Objective:      There were no vitals filed for this visit.    Physical Exam  Vitals and nursing note reviewed.   Constitutional:       Appearance: She is obese.   HENT:      Nose: Rhinorrhea present.      Mouth/Throat:      Pharynx:  Posterior oropharyngeal erythema present. No oropharyngeal exudate.   Cardiovascular:      Rate and Rhythm: Normal rate and regular rhythm.      Pulses: Normal pulses.      Heart sounds: Normal heart sounds.   Pulmonary:      Effort: Pulmonary effort is normal.      Breath sounds: Normal breath sounds.   Abdominal:      General: Bowel sounds are normal.      Palpations: Abdomen is soft.   Musculoskeletal:         General: Normal range of motion.   Skin:     General: Skin is warm.   Neurological:      Mental Status: She is alert and oriented to person, place, and time.         Assessment/Plan:     1. Type 2 diabetes mellitus with hyperglycemia, without long-term current use of insulin  Overview:  Lost 15# since starting Mounjaro, increase to 10 mg if doing well in one month can change to 3 months supply. Will notify with results of lab draw when available. Continue current treatment until results available.       Assessment & Plan:  Mounjaro 15 mg weekly.  Need continuous glucose monitor with hyperglycemia in the past..Will notify with results of lab draw when available.   Hemoglobin A1c   Date Value Ref Range Status   01/08/2024 6.2 (H) 4.8 - 5.6 % Final     Comment:              Prediabetes: 5.7 - 6.4           Diabetes: >6.4           Glycemic control for adults with diabetes: <7.0     06/27/2023 5.1 4.8 - 5.6 % Final     Comment:              Prediabetes: 5.7 - 6.4           Diabetes: >6.4           Glycemic control for adults with diabetes: <7.0     03/27/2023 5.9 (H) 4.8 - 5.6 % Final     Comment:              Prediabetes: 5.7 - 6.4           Diabetes: >6.4           Glycemic control for adults with diabetes: <7.0     05/03/2022 7.2 4.8 - 5.6    11/30/2021 6.6 (H) 4.8 - 5.6 % Final   07/12/2021 6.3 (H) 4.8 - 5.6 % Final    Currently controlled. But requires monitoring and continue low fat, low carb diet.         2. Mild intermittent asthma without complication    3. Primary hypertension  Assessment &  Plan:  Cozaar 50 mg daily. The current medical regimen is effective;  continue present plan and medications.        4. Vasomotor rhinitis  Assessment & Plan:  Nasal irrigation, using astelin helping symptoms. Zyrtec 10 mg daily prn symptoms. The current medical regimen is effective;  continue present plan and medications.        5. Chronic idiopathic constipation  Assessment & Plan:  Lactulose helping symptoms of constipation. The current medical regimen is effective;  continue present plan and medications.           No follow-ups on file.          Discussed the diagnosis, prognosis, plan of care, chronic nature of and indications for, risks and benefits of treatment for conditions.  Continue all medications as prescribed unless otherwise noted.   Call if patient develops other symptoms or if not better in 2-3 days and sooner if worse. Emphasized the importance of compliance with all recommendations, including medication use and timely follow up. Instructed to return as noted be or sooner if patient develops any other problems or if there are any other additional questions or concerns.

## 2024-03-12 ENCOUNTER — TELEPHONE (OUTPATIENT)
Dept: FAMILY MEDICINE | Facility: CLINIC | Age: 49
End: 2024-03-12
Payer: COMMERCIAL

## 2024-03-12 ENCOUNTER — DOCUMENTATION ONLY (OUTPATIENT)
Dept: FAMILY MEDICINE | Facility: CLINIC | Age: 49
End: 2024-03-12
Payer: COMMERCIAL

## 2024-03-12 DIAGNOSIS — E11.65 TYPE 2 DIABETES MELLITUS WITH HYPERGLYCEMIA, WITHOUT LONG-TERM CURRENT USE OF INSULIN: Primary | ICD-10-CM

## 2024-03-12 LAB
LEFT EYE DM RETINOPATHY: NEGATIVE
RIGHT EYE DM RETINOPATHY: NEGATIVE

## 2024-03-12 NOTE — TELEPHONE ENCOUNTER
----- Message from Dereje Noble MA sent at 3/12/2024  9:18 AM CDT -----  Pt say she is on monjaro 15mg but her sugar has been dropping to 42-45 at night so she thinks its a little too strong, can we bump he rback down to 12.5

## 2024-04-08 ENCOUNTER — OFFICE VISIT (OUTPATIENT)
Dept: FAMILY MEDICINE | Facility: CLINIC | Age: 49
End: 2024-04-08
Payer: COMMERCIAL

## 2024-04-08 VITALS
OXYGEN SATURATION: 97 % | DIASTOLIC BLOOD PRESSURE: 74 MMHG | HEART RATE: 82 BPM | TEMPERATURE: 98 F | BODY MASS INDEX: 40.18 KG/M2 | RESPIRATION RATE: 20 BRPM | HEIGHT: 67 IN | SYSTOLIC BLOOD PRESSURE: 132 MMHG | WEIGHT: 256 LBS

## 2024-04-08 DIAGNOSIS — E11.65 TYPE 2 DIABETES MELLITUS WITH HYPERGLYCEMIA, WITHOUT LONG-TERM CURRENT USE OF INSULIN: Primary | ICD-10-CM

## 2024-04-08 DIAGNOSIS — E78.5 HYPERLIPIDEMIA LDL GOAL <70: ICD-10-CM

## 2024-04-08 DIAGNOSIS — J45.20 MILD INTERMITTENT ASTHMA WITHOUT COMPLICATION: ICD-10-CM

## 2024-04-08 DIAGNOSIS — H60.8X2 CHRONIC ECZEMATOUS OTITIS EXTERNA OF LEFT EAR: ICD-10-CM

## 2024-04-08 DIAGNOSIS — J30.0 VASOMOTOR RHINITIS: ICD-10-CM

## 2024-04-08 DIAGNOSIS — E55.9 VITAMIN D DEFICIENCY: ICD-10-CM

## 2024-04-08 DIAGNOSIS — I10 PRIMARY HYPERTENSION: ICD-10-CM

## 2024-04-08 PROBLEM — H60.92 EXTERNAL OTITIS OF LEFT EAR: Status: ACTIVE | Noted: 2024-04-08

## 2024-04-08 PROCEDURE — 3075F SYST BP GE 130 - 139MM HG: CPT | Mod: CPTII,,, | Performed by: NURSE PRACTITIONER

## 2024-04-08 PROCEDURE — 99214 OFFICE O/P EST MOD 30 MIN: CPT | Mod: ,,, | Performed by: NURSE PRACTITIONER

## 2024-04-08 PROCEDURE — 3044F HG A1C LEVEL LT 7.0%: CPT | Mod: CPTII,,, | Performed by: NURSE PRACTITIONER

## 2024-04-08 PROCEDURE — 4010F ACE/ARB THERAPY RXD/TAKEN: CPT | Mod: CPTII,,, | Performed by: NURSE PRACTITIONER

## 2024-04-08 PROCEDURE — 3008F BODY MASS INDEX DOCD: CPT | Mod: CPTII,,, | Performed by: NURSE PRACTITIONER

## 2024-04-08 PROCEDURE — 1159F MED LIST DOCD IN RCRD: CPT | Mod: CPTII,,, | Performed by: NURSE PRACTITIONER

## 2024-04-08 PROCEDURE — 1160F RVW MEDS BY RX/DR IN RCRD: CPT | Mod: CPTII,,, | Performed by: NURSE PRACTITIONER

## 2024-04-08 PROCEDURE — 3078F DIAST BP <80 MM HG: CPT | Mod: CPTII,,, | Performed by: NURSE PRACTITIONER

## 2024-04-08 PROCEDURE — 2023F DILAT RTA XM W/O RTNOPTHY: CPT | Mod: CPTII,,, | Performed by: NURSE PRACTITIONER

## 2024-04-08 RX ORDER — ASPIRIN 325 MG
50000 TABLET, DELAYED RELEASE (ENTERIC COATED) ORAL
Qty: 12 CAPSULE | Refills: 1 | Status: SHIPPED | OUTPATIENT
Start: 2024-04-08

## 2024-04-08 RX ORDER — LOSARTAN POTASSIUM 50 MG/1
50 TABLET ORAL NIGHTLY
Qty: 90 TABLET | Refills: 1 | Status: SHIPPED | OUTPATIENT
Start: 2024-04-08 | End: 2024-10-05

## 2024-04-08 RX ORDER — LEVOCETIRIZINE DIHYDROCHLORIDE 5 MG/1
5 TABLET, FILM COATED ORAL NIGHTLY
Qty: 30 TABLET | Refills: 11 | Status: SHIPPED | OUTPATIENT
Start: 2024-04-08 | End: 2025-04-08

## 2024-04-08 RX ORDER — AZELASTINE 1 MG/ML
SPRAY, METERED NASAL
Qty: 30 ML | Refills: 2 | Status: SHIPPED | OUTPATIENT
Start: 2024-04-08

## 2024-04-08 NOTE — ASSESSMENT & PLAN NOTE
Hemoglobin A1c   Date Value Ref Range Status   04/01/2024 6.5 (H) 4.8 - 5.6 % Final     Comment:              Prediabetes: 5.7 - 6.4           Diabetes: >6.4           Glycemic control for adults with diabetes: <7.0     01/08/2024 6.2 (H) 4.8 - 5.6 % Final     Comment:              Prediabetes: 5.7 - 6.4           Diabetes: >6.4           Glycemic control for adults with diabetes: <7.0     06/27/2023 5.1 4.8 - 5.6 % Final     Comment:              Prediabetes: 5.7 - 6.4           Diabetes: >6.4           Glycemic control for adults with diabetes: <7.0     05/03/2022 7.2 4.8 - 5.6    11/30/2021 6.6 (H) 4.8 - 5.6 % Final   07/12/2021 6.3 (H) 4.8 - 5.6 % Final    Mounjaro 15 mg SQ weekly. Check glucose 4 or more times daily for fluctuating glucose reading with hypo and hyperglycemia.

## 2024-04-08 NOTE — ASSESSMENT & PLAN NOTE
Taking Zyrtec 10 but finding more itching especially of the ears sinus drip and sore throat with postnasal drip resume the Astelin with the Flonase.

## 2024-04-08 NOTE — PROGRESS NOTES
"SUBJECTIVE:  Sagrario Waller is a 48 y.o. female here alone for diabetes      HPI  Pt is in clinic with follow up on diabetes. At last visit she was increased to mounjaro 12.5 mg. Pt reports that she is doing well. Denies to having n/v/d. Monitoring bs at home when she thinks about it. Has been below 200. In clinic on richie it is 248. Pt also in clinic for f/u on lab results. Pt is also asking about the RBC magnesium. Check insurance coverage.     JAMES's allergies, medications, history, and problem list were updated as appropriate.    Review of Systems   A comprehensive review of symptoms was completed and negative except as noted above.    OBJECTIVE:  Vital signs  Vitals:    04/08/24 1434   BP: 132/74   BP Location: Left arm   Patient Position: Sitting   Pulse: 82   Resp: 20   Temp: 97.9 °F (36.6 °C)   SpO2: 97%   Weight: 116.1 kg (256 lb)   Height: 5' 7" (1.702 m)        Physical Exam  Vitals and nursing note reviewed.   Constitutional:       Appearance: She is obese.   HENT:      Left Ear: No swelling or tenderness. A middle ear effusion is present. Tympanic membrane is not injected.      Ears:      Comments: Left external canal with slight erythema     Nose: Rhinorrhea present.      Mouth/Throat:      Pharynx: Posterior oropharyngeal erythema (PND) present. No oropharyngeal exudate.   Cardiovascular:      Rate and Rhythm: Normal rate and regular rhythm.      Pulses: Normal pulses.      Heart sounds: Normal heart sounds.   Pulmonary:      Effort: Pulmonary effort is normal.      Breath sounds: Normal breath sounds.   Abdominal:      General: Bowel sounds are normal.      Palpations: Abdomen is soft.   Musculoskeletal:         General: Normal range of motion.   Skin:     General: Skin is warm.   Neurological:      Mental Status: She is alert and oriented to person, place, and time.          No visits with results within 1 Day(s) from this visit.   Latest known visit with results is:   Appointment on 04/02/2024 "   Component Date Value Ref Range Status    Glucose 04/01/2024 137 (H)  70 - 99 mg/dL Final    BUN 04/01/2024 14  6 - 24 mg/dL Final    Creatinine 04/01/2024 0.79  0.57 - 1.00 mg/dL Final    eGFR 04/01/2024 91  >59 mL/min/1.73 Final    BUN/Creatinine Ratio 04/01/2024 18  9 - 23 Final    Sodium 04/01/2024 135  134 - 144 mmol/L Final    Potassium 04/01/2024 4.3  3.5 - 5.2 mmol/L Final    Chloride 04/01/2024 102  96 - 106 mmol/L Final    CO2 04/01/2024 17 (L)  20 - 29 mmol/L Final    Calcium 04/01/2024 9.3  8.7 - 10.2 mg/dL Final    Protein, Total 04/01/2024 6.8  6.0 - 8.5 g/dL Final    Albumin 04/01/2024 3.9  3.9 - 4.9 g/dL Final    Globulin, Total 04/01/2024 2.9  1.5 - 4.5 g/dL Final    Albumin/Globulin Ratio 04/01/2024 1.3  1.2 - 2.2 Final    Total Bilirubin 04/01/2024 0.2  0.0 - 1.2 mg/dL Final    Alkaline Phosphatase 04/01/2024 87  44 - 121 IU/L Final    AST 04/01/2024 17  0 - 40 IU/L Final    ALT 04/01/2024 14  0 - 32 IU/L Final    Hemoglobin A1c 04/01/2024 6.5 (H)  4.8 - 5.6 % Final    Comment:          Prediabetes: 5.7 - 6.4           Diabetes: >6.4           Glycemic control for adults with diabetes: <7.0      Cholesterol 04/01/2024 168  100 - 199 mg/dL Final    Triglycerides 04/01/2024 226 (H)  0 - 149 mg/dL Final    HDL 04/01/2024 53  >39 mg/dL Final    VLDL Cholesterol [place] 04/01/2024 37  5 - 40 mg/dL Final    LDL Calculated 04/01/2024 78  0 - 99 mg/dL Final          ASSESSMENT/PLAN:    1. Type 2 diabetes mellitus with hyperglycemia, without long-term current use of insulin  Overview:  Lost 15# since starting Mounjaro, increase to 10 mg if doing well in one month can change to 3 months supply. Will notify with results of lab draw when available. Continue current treatment until results available.       Assessment & Plan:  Hemoglobin A1c   Date Value Ref Range Status   04/01/2024 6.5 (H) 4.8 - 5.6 % Final     Comment:              Prediabetes: 5.7 - 6.4           Diabetes: >6.4           Glycemic control for  adults with diabetes: <7.0     01/08/2024 6.2 (H) 4.8 - 5.6 % Final     Comment:              Prediabetes: 5.7 - 6.4           Diabetes: >6.4           Glycemic control for adults with diabetes: <7.0     06/27/2023 5.1 4.8 - 5.6 % Final     Comment:              Prediabetes: 5.7 - 6.4           Diabetes: >6.4           Glycemic control for adults with diabetes: <7.0     05/03/2022 7.2 4.8 - 5.6    11/30/2021 6.6 (H) 4.8 - 5.6 % Final   07/12/2021 6.3 (H) 4.8 - 5.6 % Final    Mounjaro 15 mg SQ weekly. Check glucose 4 or more times daily for fluctuating glucose reading with hypo and hyperglycemia.       Orders:  -     tirzepatide 15 mg/0.5 mL PnIj; Inject 15 mg into the skin every 7 days.  Dispense: 4 Pen; Refill: 1    2. Hyperlipidemia LDL goal <70  Assessment & Plan:  Crestor 10 mg low carb/low fat diet. Ldl goal <70, triglycerides 150 mg daily. Recheck in 6 months with cmp, flp. Hgb A1c was 6.2 now up to 6.5.       3. Primary hypertension  Assessment & Plan:  Losartan 50 mg daily. The current medical regimen is effective;  continue present plan and medications.      Orders:  -     losartan (COZAAR) 50 MG tablet; Take 1 tablet (50 mg total) by mouth every evening.  Dispense: 90 tablet; Refill: 1    4. Vasomotor rhinitis  Assessment & Plan:  Taking Zyrtec 10 but finding more itching especially of the ears sinus drip and sore throat with postnasal drip resume the Astelin with the Flonase.     Orders:  -     azelastine (ASTELIN) 137 mcg (0.1 %) nasal spray; SPRAY ONE(1) SPRAY IN EACH NOSTRIL TWICE DAILY FOR SINUS, ALLERGY, &/OR CONGESTION  Dispense: 30 mL; Refill: 2  -     levocetirizine (XYZAL) 5 MG tablet; Take 1 tablet (5 mg total) by mouth every evening.  Dispense: 30 tablet; Refill: 11    5. Mild intermittent asthma without complication  Assessment & Plan:  Albuterol inhaler prn. Only needed 2-3 x in past month. Has medicine if needed. Monteclast 10 daily.       6. Chronic eczematous otitis externa of left  ear  Assessment & Plan:  Mix cortaid, nystatin, aquaphor bid in ears prn for itching and dryness.       7. Vitamin D deficiency  -     cholecalciferol, vitamin D3, 1,250 mcg (50,000 unit) capsule; Take 1 capsule (50,000 Units total) by mouth every 7 days.  Dispense: 12 capsule; Refill: 1          No results found for this or any previous visit (from the past 24 hour(s)).    Follow Up:  Follow up in about 4 weeks (around 5/6/2024).      Discussed the diagnosis, prognosis, plan of care, chronic nature of and indications for, risks and benefits of treatment for conditions.  Continue all medications as prescribed unless otherwise noted.   Call if patient develops other symptoms or if not better in 2-3 days and sooner if worse. Emphasized the importance of compliance with all recommendations, including medication use and timely follow up. Instructed to return as noted be or sooner if patient develops any other problems or if there are any other additional questions or concerns.

## 2024-04-08 NOTE — ASSESSMENT & PLAN NOTE
Crestor 10 mg low carb/low fat diet. Ldl goal <70, triglycerides 150 mg daily. Recheck in 6 months with isaiah chatman. Hgb A1c was 6.2 now up to 6.5.

## 2024-04-08 NOTE — ASSESSMENT & PLAN NOTE
Albuterol inhaler prn. Only needed 2-3 x in past month. Has medicine if needed. Monteclast 10 daily.

## 2024-04-24 DIAGNOSIS — E11.65 TYPE 2 DIABETES MELLITUS WITH HYPERGLYCEMIA, WITHOUT LONG-TERM CURRENT USE OF INSULIN: Primary | ICD-10-CM

## 2024-04-24 RX ORDER — SYRINGE WITH NEEDLE, 1 ML 25GX5/8"
SYRINGE, EMPTY DISPOSABLE MISCELLANEOUS
Qty: 2 EACH | Refills: 1 | Status: SHIPPED | OUTPATIENT
Start: 2024-04-24 | End: 2024-05-29

## 2024-05-29 DIAGNOSIS — D51.8 VITAMIN B12 DEFICIENCY (DIETARY) ANEMIA: ICD-10-CM

## 2024-05-29 DIAGNOSIS — E11.65 TYPE 2 DIABETES MELLITUS WITH HYPERGLYCEMIA, WITHOUT LONG-TERM CURRENT USE OF INSULIN: ICD-10-CM

## 2024-05-29 DIAGNOSIS — M19.90 ARTHRITIS: ICD-10-CM

## 2024-05-29 RX ORDER — SYRINGE WITH NEEDLE, 1 ML 25GX5/8"
SYRINGE, EMPTY DISPOSABLE MISCELLANEOUS
Qty: 2 EACH | Refills: 1 | Status: SHIPPED | OUTPATIENT
Start: 2024-05-29

## 2024-05-29 RX ORDER — CYANOCOBALAMIN 1000 UG/ML
INJECTION, SOLUTION INTRAMUSCULAR; SUBCUTANEOUS
Qty: 2 ML | Refills: 1 | Status: SHIPPED | OUTPATIENT
Start: 2024-05-29

## 2024-05-29 RX ORDER — IBUPROFEN AND FAMOTIDINE 26.6; 8 MG/1; MG/1
TABLET ORAL
Qty: 30 TABLET | Refills: 3 | Status: SHIPPED | OUTPATIENT
Start: 2024-05-29

## 2024-06-28 DIAGNOSIS — E11.9 DIABETES MELLITUS WITHOUT COMPLICATION: ICD-10-CM

## 2024-06-28 DIAGNOSIS — D51.8 VITAMIN B12 DEFICIENCY (DIETARY) ANEMIA: ICD-10-CM

## 2024-06-29 DIAGNOSIS — I10 PRIMARY HYPERTENSION: ICD-10-CM

## 2024-06-29 DIAGNOSIS — T78.40XD ALLERGY, SUBSEQUENT ENCOUNTER: ICD-10-CM

## 2024-06-29 DIAGNOSIS — J45.20 MILD INTERMITTENT ASTHMA WITHOUT COMPLICATION: ICD-10-CM

## 2024-06-29 DIAGNOSIS — E78.5 HYPERLIPIDEMIA LDL GOAL <70: ICD-10-CM

## 2024-06-29 DIAGNOSIS — K59.09 CHRONIC CONSTIPATION: ICD-10-CM

## 2024-06-29 DIAGNOSIS — L70.0 ACNE VULGARIS: ICD-10-CM

## 2024-07-01 DIAGNOSIS — K59.09 CHRONIC CONSTIPATION: ICD-10-CM

## 2024-07-01 DIAGNOSIS — E11.65 TYPE 2 DIABETES MELLITUS WITH HYPERGLYCEMIA, WITHOUT LONG-TERM CURRENT USE OF INSULIN: ICD-10-CM

## 2024-07-01 RX ORDER — LOSARTAN POTASSIUM 50 MG/1
TABLET ORAL
Refills: 0 | OUTPATIENT
Start: 2024-07-01

## 2024-07-01 RX ORDER — ALBUTEROL SULFATE 90 UG/1
AEROSOL, METERED RESPIRATORY (INHALATION)
Refills: 0 | OUTPATIENT
Start: 2024-07-01

## 2024-07-01 RX ORDER — ALBUTEROL SULFATE 90 UG/1
AEROSOL, METERED RESPIRATORY (INHALATION)
Qty: 6.7 G | Refills: 0 | Status: SHIPPED | OUTPATIENT
Start: 2024-07-01

## 2024-07-01 RX ORDER — TRETINOIN 0.5 MG/G
CREAM TOPICAL NIGHTLY
Qty: 20 G | Refills: 1 | Status: SHIPPED | OUTPATIENT
Start: 2024-07-01

## 2024-07-01 RX ORDER — ROSUVASTATIN CALCIUM 10 MG/1
TABLET, COATED ORAL
Refills: 0 | OUTPATIENT
Start: 2024-07-01

## 2024-07-01 RX ORDER — LOSARTAN POTASSIUM 50 MG/1
50 TABLET ORAL NIGHTLY
Qty: 90 TABLET | Refills: 1 | Status: SHIPPED | OUTPATIENT
Start: 2024-07-01 | End: 2024-12-28

## 2024-07-01 RX ORDER — LACTULOSE 10 G/15ML
SOLUTION ORAL; RECTAL
Refills: 0 | OUTPATIENT
Start: 2024-07-01

## 2024-07-01 RX ORDER — TRETINOIN 0.5 MG/G
CREAM TOPICAL
Refills: 0 | OUTPATIENT
Start: 2024-07-01

## 2024-07-01 RX ORDER — LACTULOSE 10 G/15ML
10 SOLUTION ORAL DAILY PRN
Qty: 480 ML | Refills: 1 | Status: SHIPPED | OUTPATIENT
Start: 2024-07-01

## 2024-07-01 RX ORDER — NITROFURANTOIN 25; 75 MG/1; MG/1
CAPSULE ORAL
Refills: 0 | OUTPATIENT
Start: 2024-07-01

## 2024-07-01 RX ORDER — LACTULOSE 10 G/15ML
10 SOLUTION ORAL DAILY PRN
Qty: 480 ML | Refills: 1 | Status: SHIPPED | OUTPATIENT
Start: 2024-07-01 | End: 2024-07-01 | Stop reason: SDUPTHER

## 2024-07-01 RX ORDER — MONTELUKAST SODIUM 10 MG/1
TABLET ORAL
Refills: 0 | OUTPATIENT
Start: 2024-07-01

## 2024-07-01 RX ORDER — ROSUVASTATIN CALCIUM 10 MG/1
10 TABLET, COATED ORAL NIGHTLY
Qty: 90 TABLET | Refills: 1 | Status: SHIPPED | OUTPATIENT
Start: 2024-07-01

## 2024-07-01 RX ORDER — MONTELUKAST SODIUM 10 MG/1
10 TABLET ORAL NIGHTLY
Qty: 90 TABLET | Refills: 1 | Status: SHIPPED | OUTPATIENT
Start: 2024-07-01

## 2024-07-01 RX ORDER — FLASH GLUCOSE SENSOR
KIT MISCELLANEOUS
Qty: 1 KIT | Refills: 4 | Status: SHIPPED | OUTPATIENT
Start: 2024-07-01

## 2024-07-01 RX ORDER — CYANOCOBALAMIN 1000 UG/ML
INJECTION, SOLUTION INTRAMUSCULAR; SUBCUTANEOUS
Qty: 2 ML | Refills: 1 | Status: SHIPPED | OUTPATIENT
Start: 2024-07-01

## 2024-07-08 ENCOUNTER — TELEPHONE (OUTPATIENT)
Dept: FAMILY MEDICINE | Facility: CLINIC | Age: 49
End: 2024-07-08

## 2024-07-08 DIAGNOSIS — K59.09 CHRONIC CONSTIPATION: ICD-10-CM

## 2024-07-08 NOTE — TELEPHONE ENCOUNTER
----- Message from Nkechi Oconnell sent at 7/8/2024  2:59 PM CDT -----  Regarding: Call pharmacy  Lakeisha with [facility] called to get some clarification on a medication for this patient.  The med is Lactulose Solution (const) 10gm - 15 and the directions is what needs to be clarified.    Please call [phone]  Reference # [id number]

## 2024-07-09 DIAGNOSIS — K59.09 CHRONIC CONSTIPATION: ICD-10-CM

## 2024-07-09 RX ORDER — LACTULOSE 10 G/15ML
10 SOLUTION ORAL DAILY PRN
Qty: 480 ML | Refills: 1 | Status: SHIPPED | OUTPATIENT
Start: 2024-07-09

## 2024-07-26 DIAGNOSIS — J30.0 VASOMOTOR RHINITIS: ICD-10-CM

## 2024-07-26 DIAGNOSIS — E11.65 TYPE 2 DIABETES MELLITUS WITH HYPERGLYCEMIA, WITHOUT LONG-TERM CURRENT USE OF INSULIN: ICD-10-CM

## 2024-07-29 RX ORDER — FLUTICASONE PROPIONATE 50 MCG
SPRAY, SUSPENSION (ML) NASAL
Qty: 16 G | Refills: 3 | Status: SHIPPED | OUTPATIENT
Start: 2024-07-29

## 2024-07-29 RX ORDER — SYRINGE WITH NEEDLE, 1 ML 25GX5/8"
SYRINGE, EMPTY DISPOSABLE MISCELLANEOUS
Qty: 2 EACH | Refills: 1 | Status: SHIPPED | OUTPATIENT
Start: 2024-07-29

## 2024-10-01 DIAGNOSIS — L70.0 ACNE VULGARIS: ICD-10-CM

## 2024-10-01 DIAGNOSIS — M79.675 PAIN DUE TO ONYCHOMYCOSIS OF TOENAIL OF LEFT FOOT: ICD-10-CM

## 2024-10-01 DIAGNOSIS — J45.30 MILD PERSISTENT ASTHMA WITHOUT COMPLICATION: ICD-10-CM

## 2024-10-01 DIAGNOSIS — E11.65 TYPE 2 DIABETES MELLITUS WITH HYPERGLYCEMIA, WITHOUT LONG-TERM CURRENT USE OF INSULIN: ICD-10-CM

## 2024-10-01 DIAGNOSIS — B35.1 PAIN DUE TO ONYCHOMYCOSIS OF TOENAIL OF LEFT FOOT: ICD-10-CM

## 2024-10-01 DIAGNOSIS — M19.90 ARTHRITIS: ICD-10-CM

## 2024-10-01 DIAGNOSIS — D51.8 VITAMIN B12 DEFICIENCY (DIETARY) ANEMIA: ICD-10-CM

## 2024-10-01 RX ORDER — TRETINOIN 0.5 MG/G
CREAM TOPICAL
Qty: 20 G | Refills: 1 | OUTPATIENT
Start: 2024-10-01

## 2024-10-01 RX ORDER — SYRINGE WITH NEEDLE, 1 ML 25GX5/8"
SYRINGE, EMPTY DISPOSABLE MISCELLANEOUS
Qty: 2 EACH | Refills: 1 | Status: SHIPPED | OUTPATIENT
Start: 2024-10-01

## 2024-10-01 RX ORDER — CYANOCOBALAMIN 1000 UG/ML
INJECTION, SOLUTION INTRAMUSCULAR; SUBCUTANEOUS
Qty: 2 ML | Refills: 1 | Status: SHIPPED | OUTPATIENT
Start: 2024-10-01

## 2024-10-01 RX ORDER — BETAMETHASONE VALERATE 1 MG/G
CREAM TOPICAL
Qty: 45 G | Refills: 1 | OUTPATIENT
Start: 2024-10-01

## 2024-10-01 RX ORDER — IBUPROFEN AND FAMOTIDINE 26.6; 8 MG/1; MG/1
TABLET ORAL
Qty: 30 TABLET | Refills: 3 | Status: SHIPPED | OUTPATIENT
Start: 2024-10-01

## 2024-10-01 RX ORDER — MUPIROCIN 20 MG/G
OINTMENT TOPICAL
Qty: 22 G | Refills: 1 | OUTPATIENT
Start: 2024-10-01

## 2024-10-01 RX ORDER — FLUTICASONE FUROATE, UMECLIDINIUM BROMIDE AND VILANTEROL TRIFENATATE 200; 62.5; 25 UG/1; UG/1; UG/1
POWDER RESPIRATORY (INHALATION)
Qty: 60 EACH | Refills: 3 | Status: SHIPPED | OUTPATIENT
Start: 2024-10-01

## 2024-10-09 ENCOUNTER — OFFICE VISIT (OUTPATIENT)
Dept: FAMILY MEDICINE | Facility: CLINIC | Age: 49
End: 2024-10-09
Payer: COMMERCIAL

## 2024-10-09 VITALS
TEMPERATURE: 98 F | WEIGHT: 256 LBS | DIASTOLIC BLOOD PRESSURE: 68 MMHG | BODY MASS INDEX: 40.18 KG/M2 | HEIGHT: 67 IN | OXYGEN SATURATION: 97 % | RESPIRATION RATE: 20 BRPM | SYSTOLIC BLOOD PRESSURE: 121 MMHG | HEART RATE: 81 BPM

## 2024-10-09 DIAGNOSIS — L57.0 AK (ACTINIC KERATOSIS): ICD-10-CM

## 2024-10-09 DIAGNOSIS — E78.5 HYPERLIPIDEMIA LDL GOAL <70: ICD-10-CM

## 2024-10-09 DIAGNOSIS — E11.65 TYPE 2 DIABETES MELLITUS WITH HYPERGLYCEMIA, WITHOUT LONG-TERM CURRENT USE OF INSULIN: Primary | ICD-10-CM

## 2024-10-09 DIAGNOSIS — J01.11 ACUTE RECURRENT FRONTAL SINUSITIS: ICD-10-CM

## 2024-10-09 DIAGNOSIS — B00.9 HERPES SIMPLEX INFECTION: ICD-10-CM

## 2024-10-09 DIAGNOSIS — E11.9 COMPREHENSIVE DIABETIC FOOT EXAMINATION, TYPE 2 DM, ENCOUNTER FOR: ICD-10-CM

## 2024-10-09 LAB — GLUCOSE SERPL-MCNC: 143 MG/DL (ref 70–110)

## 2024-10-09 PROCEDURE — 3074F SYST BP LT 130 MM HG: CPT | Mod: CPTII,,, | Performed by: NURSE PRACTITIONER

## 2024-10-09 PROCEDURE — 4010F ACE/ARB THERAPY RXD/TAKEN: CPT | Mod: CPTII,,, | Performed by: NURSE PRACTITIONER

## 2024-10-09 PROCEDURE — 99214 OFFICE O/P EST MOD 30 MIN: CPT | Mod: ,,, | Performed by: NURSE PRACTITIONER

## 2024-10-09 PROCEDURE — 1159F MED LIST DOCD IN RCRD: CPT | Mod: CPTII,,, | Performed by: NURSE PRACTITIONER

## 2024-10-09 PROCEDURE — 2023F DILAT RTA XM W/O RTNOPTHY: CPT | Mod: CPTII,,, | Performed by: NURSE PRACTITIONER

## 2024-10-09 PROCEDURE — 3061F NEG MICROALBUMINURIA REV: CPT | Mod: CPTII,,, | Performed by: NURSE PRACTITIONER

## 2024-10-09 PROCEDURE — 3078F DIAST BP <80 MM HG: CPT | Mod: CPTII,,, | Performed by: NURSE PRACTITIONER

## 2024-10-09 PROCEDURE — 3008F BODY MASS INDEX DOCD: CPT | Mod: CPTII,,, | Performed by: NURSE PRACTITIONER

## 2024-10-09 PROCEDURE — 3044F HG A1C LEVEL LT 7.0%: CPT | Mod: CPTII,,, | Performed by: NURSE PRACTITIONER

## 2024-10-09 PROCEDURE — 3066F NEPHROPATHY DOC TX: CPT | Mod: CPTII,,, | Performed by: NURSE PRACTITIONER

## 2024-10-09 PROCEDURE — 1160F RVW MEDS BY RX/DR IN RCRD: CPT | Mod: CPTII,,, | Performed by: NURSE PRACTITIONER

## 2024-10-09 PROCEDURE — 82962 GLUCOSE BLOOD TEST: CPT | Mod: ,,, | Performed by: NURSE PRACTITIONER

## 2024-10-09 RX ORDER — ROSUVASTATIN CALCIUM 10 MG/1
20 TABLET, COATED ORAL NIGHTLY
Qty: 90 TABLET | Refills: 1 | Status: SHIPPED | OUTPATIENT
Start: 2024-10-09

## 2024-10-09 RX ORDER — DOXYCYCLINE 100 MG/1
100 CAPSULE ORAL 2 TIMES DAILY
Qty: 20 CAPSULE | Status: SHIPPED | OUTPATIENT
Start: 2024-10-09 | End: 2024-10-09

## 2024-10-09 RX ORDER — VALACYCLOVIR HYDROCHLORIDE 1 G/1
1000 TABLET, FILM COATED ORAL 3 TIMES DAILY
Qty: 21 TABLET | Refills: 2 | Status: SHIPPED | OUTPATIENT
Start: 2024-10-09

## 2024-10-09 RX ORDER — DOXYCYCLINE 100 MG/1
100 CAPSULE ORAL 2 TIMES DAILY
Qty: 20 CAPSULE | Status: SHIPPED | OUTPATIENT
Start: 2024-10-09

## 2024-10-09 NOTE — ASSESSMENT & PLAN NOTE
Nasal irrigation with flonase/astlin add doxycycline 100 mg every 12h for 10 days. Call with update or continued problems. Use inhalers with asthma. Refer to ENT for Mendoza for recurrent sinusitis 3-4 episodes year requiring antibiotic.

## 2024-10-09 NOTE — PROGRESS NOTES
"Patient ID: Sagrario Waller  : 1975     Chief Complaint: Diabetes and review labs    Allergies: Patient is allergic to sulfa (sulfonamide antibiotics).     History of Present Illness:  The patient is a 49 y.o. White female who presents to clinic for evaluation and management with a chief complaint of Diabetes and review labs   HPI   Patient in clinic to review labs and on diabetes. Blood sugar running between 130-170's with few readings > 250's. Less rest more sinus drainage and congestion with cold sore to nares bilaterally. Also complains of sores on inside of nostril. Also complains of lesions to right wrist that she would like burn. States that she noticed them over the weekend.     Past Medical History:  has a past medical history of ADHD (attention deficit hyperactivity disorder), Anxiety, Arthritis, Asthma, COPD (chronic obstructive pulmonary disease), Depression, Diabetes mellitus, type 2, GERD (gastroesophageal reflux disease), Hyperlipidemia, and Hypertension.    Social History:  reports that she has quit smoking. Her smoking use included cigarettes. She has never been exposed to tobacco smoke. She has never used smokeless tobacco. She reports current alcohol use. She reports that she does not use drugs.    Care Team: Patient Care Team:  Genesis Lopez DNP as PCP - General (Family Medicine)     Current Medications:  Current Outpatient Medications   Medication Instructions    albuterol (PROVENTIL/VENTOLIN HFA) 90 mcg/actuation inhaler INHALE TWO(2) PUFFS INTO THE LUNGS 4 TIMES DAILY AS NEEDED FOR WHEEZING /SHAKE WELL    ALPRAZolam (XANAX) 2 mg, 2 times daily    azelastine (ASTELIN) 137 mcg (0.1 %) nasal spray SPRAY ONE(1) SPRAY IN EACH NOSTRIL TWICE DAILY FOR SINUS, ALLERGY, &/OR CONGESTION    BD LUER-LALITO SYRINGE 3 mL 23 x 1" Syrg USE WITH B-12    buPROPion (WELLBUTRIN XL) 300 mg, Every morning    cholecalciferol (vitamin D3) 50,000 Units, Oral, Every 7 days    clindamycin (CLEOCIN T) 1 % " external solution Nightly    cyanocobalamin 1,000 mcg/mL injection INJECT 1 ML IN MUSCLE EVERY 14 DAYS AS DIRECTED    dextroamphetamine-amphetamine 30 mg Tab 1 tablet, 2 times daily    doxepin (SINEQUAN) 100 mg    doxycycline (VIBRAMYCIN) 100 mg, Oral, 2 times daily    empagliflozin (JARDIANCE) 25 mg, Oral, Daily    flash glucose sensor (FREESTYLE MAHAD 14 DAY SENSOR) Kit CHECK BLOOD SUGAR AS DIRECTED    fluticasone propionate (FLONASE) 50 mcg/actuation nasal spray SPRAY ONE(1) SPRAY IN EACH NOSTRIL TWICE DAILY FOR SINUS, ALLERGY, &/OR CONGESTION /SHAKE GENTLY    ibuprofen-famotidine (DUEXIS) 800-26.6 mg Tab TAKE ONE(1) TAB BY MOUTH ONCE A DAY WITH FULL GLASS OF WATER & FOOD AS NEEDED FOR PAIN    lactulose (CHRONULAC) 10 g, Oral, Daily PRN, Can increase to 30 mL daily if needed.    levocetirizine (XYZAL) 5 mg, Oral, Nightly    losartan (COZAAR) 50 mg, Oral, Nightly    montelukast (SINGULAIR) 10 mg, Oral, Nightly    mupirocin (BACTROBAN) 2 % ointment APPLY TO AFFECTED AREA(S) 3 TIMES DAILY AS DIRECTED FOR INFECTION    naloxone (NARCAN) 4 mg/actuation Spry ONE(1) SPRAY IN A NOSTRIL IN AN OPIOID EMERGENCY / MAY REPEAT IN 2-3 MINUTES AS NEEDED    OXcarbazepine (TRILEPTAL) 300 mg, 2 times daily    rosuvastatin (CRESTOR) 20 mg, Oral, Nightly    tirzepatide 15 mg, Subcutaneous, Every 7 days    TRELEGY ELLIPTA 200-62.5-25 mcg inhaler INHALE ONE(1) PUFF INTO THE LUNGS ONCE A DAY AT THE SAME TIME EACH DAY FOR BREATHING /RINSE MOUTH AFTER USE    tretinoin (RETIN-A) 0.05 % cream Topical (Top), Nightly    valACYclovir (VALTREX) 1,000 mg, Oral, 3 times daily       Review of Systems   Constitutional:  Positive for appetite change and fatigue. Negative for chills, diaphoresis and fever.   HENT:  Positive for nasal congestion, nosebleeds and sinus pressure/congestion.    Eyes: Negative.    Respiratory:  Positive for cough. Negative for shortness of breath and wheezing.    Cardiovascular: Negative.    Gastrointestinal: Negative.   "  Endocrine: Negative.    Allergic/Immunologic: Positive for environmental allergies.   Hematological: Negative.    Psychiatric/Behavioral:  Positive for dysphoric mood and sleep disturbance. Negative for agitation, behavioral problems and depressed mood. The patient is nervous/anxious.         Visit Vitals  /68 (BP Location: Left arm, Patient Position: Sitting)   Pulse 81   Temp 98.1 °F (36.7 °C)   Resp 20   Ht 5' 7" (1.702 m)   Wt 116.1 kg (256 lb)   SpO2 97%   BMI 40.10 kg/m²       Physical Exam  Vitals and nursing note reviewed.   HENT:      Head: Normocephalic.      Nose: Congestion present.      Mouth/Throat:      Mouth: Mucous membranes are moist.   Cardiovascular:      Rate and Rhythm: Normal rate and regular rhythm.      Pulses: Normal pulses.      Heart sounds: Normal heart sounds.   Pulmonary:      Effort: Pulmonary effort is normal.      Breath sounds: Normal breath sounds.   Skin:     General: Skin is warm.      Findings: Lesion present.          Neurological:      Mental Status: She is oriented to person, place, and time.          Labs Reviewed:  Chemistry:  Lab Results   Component Value Date     10/07/2024    K 4.7 10/07/2024    BUN 10 10/07/2024    CREATININE 0.70 10/07/2024    EGFRNORACEVR 105 10/07/2024    GLUCOSE 115 (H) 11/30/2021    CALCIUM 9.6 10/07/2024    ALKPHOS 94 11/30/2021    LABPROT 7.4 11/30/2021    ALBUMIN 4.1 10/07/2024    AST 29 10/07/2024    ALT 30 10/07/2024    MG 2.0 01/08/2024    [id number] 45.5 07/24/2024    TSH 3.030 10/07/2024        Lab Results   Component Value Date    HGBA1C 6.9 (H) 10/07/2024        Hematology:  Lab Results   Component Value Date    WBC 12.5 (H) 10/07/2024    RBC 5.16 10/07/2024    HGB 15.4 10/07/2024    HCT 47.1 (H) 10/07/2024    MCV 91 10/07/2024    MCH 29.8 10/07/2024    MCHC 32.7 10/07/2024    RDW 12.2 10/07/2024     10/07/2024    MONO 8 10/07/2024    MONO 1.0 (H) 10/07/2024    BASO 0.1 10/07/2024    EOSINOPHIL 3 10/07/2024    " BASOPHIL 1 10/07/2024       Lipid Panel:  Lab Results   Component Value Date    CHOL 157 10/07/2024    HDL 46 10/07/2024    LDLCALC 81 10/07/2024    TRIG 173 (H) 10/07/2024    TOTALCHOLEST 3.9 05/03/2022        Assessment & Plan:  1. Type 2 diabetes mellitus with hyperglycemia, without long-term current use of insulin  Overview:  Lost 15# since starting Mounjaro, increase to 10 mg if doing well in one month can change to 3 months supply. Will notify with results of lab draw when available. Continue current treatment until results available.       Assessment & Plan:  Mounjaro 10 mg every 7 days. Januvia Recheck hgb A1c in 3 months.   Hemoglobin A1c   Date Value Ref Range Status   10/07/2024 6.9 (H) 4.8 - 5.6 % Final     Comment:              Prediabetes: 5.7 - 6.4           Diabetes: >6.4           Glycemic control for adults with diabetes: <7.0     07/24/2024 7.1 (H) 4.8 - 5.6 % Final     Comment:              Prediabetes: 5.7 - 6.4           Diabetes: >6.4           Glycemic control for adults with diabetes: <7.0     04/01/2024 6.5 (H) 4.8 - 5.6 % Final     Comment:              Prediabetes: 5.7 - 6.4           Diabetes: >6.4           Glycemic control for adults with diabetes: <7.0     05/03/2022 7.2 4.8 - 5.6    11/30/2021 6.6 (H) 4.8 - 5.6 % Final   07/12/2021 6.3 (H) 4.8 - 5.6 % Final        Orders:  -     POCT Glucose, Hand-Held Device  -     empagliflozin (JARDIANCE) 25 mg tablet; Take 1 tablet (25 mg total) by mouth once daily.  Dispense: 30 tablet; Refill: 1  -     tirzepatide 15 mg/0.5 mL PnIj; Inject 15 mg into the skin every 7 days.  Dispense: 4 Pen; Refill: 5    2. AK (actinic keratosis)  Assessment & Plan:  Right wrist with raised flesh colored scaly skin lesion. Liquid nitrogen to area. Wash with soap and water. Notify any sign infection.       3. Acute recurrent frontal sinusitis  Assessment & Plan:  Nasal irrigation with flonase/astlin add doxycycline 100 mg every 12h for 10 days. Call with update  or continued problems. Use inhalers with asthma. Refer to ENT for Mendoza for recurrent sinusitis 3-4 episodes year requiring antibiotic.     Orders:  -     doxycycline (VIBRAMYCIN) 100 MG Cap; Take 1 capsule (100 mg total) by mouth 2 (two) times daily.  Dispense: 20 capsule; Refill: -    4. Herpes simplex infection  Assessment & Plan:  Valtrex 1000 tid for 7 days. Keep dry notify secondary infection.     Orders:  -     valACYclovir (VALTREX) 1000 MG tablet; Take 1 tablet (1,000 mg total) by mouth 3 (three) times daily.  Dispense: 21 tablet; Refill: 2    5. Hyperlipidemia LDL goal <70  Assessment & Plan:  Increase rosuvastatin 20 mg daily with low fat low dairy diet. Recheck in 3 months.     Orders:  -     rosuvastatin (CRESTOR) 10 MG tablet; Take 2 tablets (20 mg total) by mouth every evening.  Dispense: 90 tablet; Refill: 1    6. Comprehensive diabetic foot examination, type 2 DM, encounter for  Assessment & Plan:  Protective Sensation (w/ 10 gram monofilament):  Right: Intact  Left: Intact    Visual Inspection:  Normal -  Bilateral    Pedal Pulses:   Right: Present  Left: Present    Posterior Tibialis Pulses:   Right:Present  Left: Present            Future Appointments   Date Time Provider Department Center   1/13/2025  7:45 AM NURSE [facility] FAMILY MEDICINE [facility] JOELLE Herrera   7/30/2025  8:45 AM [facility]       Follow up in about 3 months (around 1/9/2025). Call sooner if needed.    Genesis Lopez, JR    Lab Frequency Next Occurrence   Mammo Digital Diagnostic Bilat with Lalit Once 10/04/2023   US Breast Left Limited Once 10/04/2023

## 2024-10-09 NOTE — ASSESSMENT & PLAN NOTE
Right wrist with raised flesh colored scaly skin lesion. Liquid nitrogen to area. Wash with soap and water. Notify any sign infection.

## 2024-10-09 NOTE — ASSESSMENT & PLAN NOTE
Mounjaro 10 mg every 7 days. Januvia Recheck hgb A1c in 3 months.   Hemoglobin A1c   Date Value Ref Range Status   10/07/2024 6.9 (H) 4.8 - 5.6 % Final     Comment:              Prediabetes: 5.7 - 6.4           Diabetes: >6.4           Glycemic control for adults with diabetes: <7.0     07/24/2024 7.1 (H) 4.8 - 5.6 % Final     Comment:              Prediabetes: 5.7 - 6.4           Diabetes: >6.4           Glycemic control for adults with diabetes: <7.0     04/01/2024 6.5 (H) 4.8 - 5.6 % Final     Comment:              Prediabetes: 5.7 - 6.4           Diabetes: >6.4           Glycemic control for adults with diabetes: <7.0     05/03/2022 7.2 4.8 - 5.6    11/30/2021 6.6 (H) 4.8 - 5.6 % Final   07/12/2021 6.3 (H) 4.8 - 5.6 % Final

## 2024-10-29 ENCOUNTER — TELEPHONE (OUTPATIENT)
Dept: FAMILY MEDICINE | Facility: CLINIC | Age: 49
End: 2024-10-29
Payer: COMMERCIAL

## 2024-10-29 DIAGNOSIS — Z11.1 SCREENING-PULMONARY TB: Primary | ICD-10-CM

## 2024-10-30 ENCOUNTER — HOSPITAL ENCOUNTER (OUTPATIENT)
Dept: RADIOLOGY | Facility: HOSPITAL | Age: 49
Discharge: HOME | End: 2024-10-30
Attending: NURSE PRACTITIONER
Payer: COMMERCIAL

## 2024-10-30 DIAGNOSIS — Z11.1 SCREENING-PULMONARY TB: ICD-10-CM

## 2024-10-30 PROCEDURE — 71046 X-RAY EXAM CHEST 2 VIEWS: CPT | Mod: TC

## 2024-12-03 DIAGNOSIS — D51.8 VITAMIN B12 DEFICIENCY (DIETARY) ANEMIA: ICD-10-CM

## 2024-12-03 RX ORDER — CYANOCOBALAMIN 1000 UG/ML
INJECTION, SOLUTION INTRAMUSCULAR; SUBCUTANEOUS
Qty: 2 ML | Refills: 1 | Status: SHIPPED | OUTPATIENT
Start: 2024-12-03

## 2025-01-02 ENCOUNTER — OFFICE VISIT (OUTPATIENT)
Dept: FAMILY MEDICINE | Facility: CLINIC | Age: 50
End: 2025-01-02
Payer: COMMERCIAL

## 2025-01-02 VITALS
RESPIRATION RATE: 20 BRPM | HEART RATE: 114 BPM | TEMPERATURE: 100 F | SYSTOLIC BLOOD PRESSURE: 112 MMHG | HEIGHT: 67 IN | BODY MASS INDEX: 40.49 KG/M2 | DIASTOLIC BLOOD PRESSURE: 62 MMHG | WEIGHT: 258 LBS | OXYGEN SATURATION: 97 %

## 2025-01-02 DIAGNOSIS — J30.0 VASOMOTOR RHINITIS: ICD-10-CM

## 2025-01-02 DIAGNOSIS — E11.65 TYPE 2 DIABETES MELLITUS WITH HYPERGLYCEMIA, WITHOUT LONG-TERM CURRENT USE OF INSULIN: ICD-10-CM

## 2025-01-02 DIAGNOSIS — M19.90 ARTHRITIS: ICD-10-CM

## 2025-01-02 DIAGNOSIS — J45.30 MILD PERSISTENT ASTHMA WITHOUT COMPLICATION: ICD-10-CM

## 2025-01-02 DIAGNOSIS — T78.40XD ALLERGY, SUBSEQUENT ENCOUNTER: ICD-10-CM

## 2025-01-02 DIAGNOSIS — J45.20 MILD INTERMITTENT ASTHMA WITHOUT COMPLICATION: ICD-10-CM

## 2025-01-02 DIAGNOSIS — E11.9 DIABETES MELLITUS WITHOUT COMPLICATION: ICD-10-CM

## 2025-01-02 DIAGNOSIS — R05.9 COUGH, UNSPECIFIED TYPE: ICD-10-CM

## 2025-01-02 DIAGNOSIS — E78.5 HYPERLIPIDEMIA LDL GOAL <70: Primary | ICD-10-CM

## 2025-01-02 DIAGNOSIS — J10.1 INFLUENZA A: Primary | ICD-10-CM

## 2025-01-02 DIAGNOSIS — D51.8 VITAMIN B12 DEFICIENCY (DIETARY) ANEMIA: ICD-10-CM

## 2025-01-02 LAB
CTP QC/QA: YES
FLUAV AG NPH QL: POSITIVE
FLUBV AG NPH QL: NEGATIVE
S PYO RRNA THROAT QL PROBE: NEGATIVE
SARS-COV-2 AG RESP QL IA.RAPID: NEGATIVE

## 2025-01-02 RX ORDER — SUVOREXANT 20 MG/1
TABLET, FILM COATED ORAL
COMMUNITY
Start: 2024-12-03 | End: 2025-04-02

## 2025-01-02 RX ORDER — SYRINGE,SAFETY WITH NEEDLE,3ML 23GX1"
SYRINGE, EMPTY DISPOSABLE MISCELLANEOUS
Qty: 2 EACH | Refills: 1 | Status: SHIPPED | OUTPATIENT
Start: 2025-01-02

## 2025-01-02 RX ORDER — FLUTICASONE PROPIONATE 50 MCG
SPRAY, SUSPENSION (ML) NASAL
Qty: 16 G | Refills: 3 | Status: SHIPPED | OUTPATIENT
Start: 2025-01-02

## 2025-01-02 RX ORDER — CYANOCOBALAMIN 1000 UG/ML
INJECTION, SOLUTION INTRAMUSCULAR; SUBCUTANEOUS
Qty: 2 ML | Refills: 1 | Status: SHIPPED | OUTPATIENT
Start: 2025-01-02

## 2025-01-02 RX ORDER — FLASH GLUCOSE SENSOR
KIT MISCELLANEOUS
Qty: 1 KIT | Refills: 4 | Status: SHIPPED | OUTPATIENT
Start: 2025-01-02

## 2025-01-02 RX ORDER — ALBUTEROL SULFATE 90 UG/1
INHALANT RESPIRATORY (INHALATION)
Qty: 6.7 G | Refills: 0 | Status: SHIPPED | OUTPATIENT
Start: 2025-01-02

## 2025-01-02 RX ORDER — EMPAGLIFLOZIN 25 MG/1
TABLET, FILM COATED ORAL
Qty: 30 TABLET | Refills: 1 | Status: SHIPPED | OUTPATIENT
Start: 2025-01-02

## 2025-01-02 RX ORDER — IBUPROFEN AND FAMOTIDINE 26.6; 8 MG/1; MG/1
TABLET ORAL
Qty: 30 TABLET | Refills: 3 | Status: SHIPPED | OUTPATIENT
Start: 2025-01-02

## 2025-01-02 RX ORDER — MONTELUKAST SODIUM 10 MG/1
TABLET ORAL
Qty: 90 TABLET | Refills: 1 | Status: SHIPPED | OUTPATIENT
Start: 2025-01-02

## 2025-01-02 RX ORDER — OSELTAMIVIR PHOSPHATE 75 MG/1
75 CAPSULE ORAL 2 TIMES DAILY
Qty: 10 CAPSULE | Refills: 0 | Status: SHIPPED | OUTPATIENT
Start: 2025-01-02 | End: 2025-01-07

## 2025-01-02 RX ORDER — ROSUVASTATIN CALCIUM 20 MG/1
TABLET, COATED ORAL
Qty: 90 TABLET | Refills: 1 | Status: SHIPPED | OUTPATIENT
Start: 2025-01-02

## 2025-01-02 RX ORDER — FLUTICASONE FUROATE, UMECLIDINIUM BROMIDE AND VILANTEROL TRIFENATATE 200; 62.5; 25 UG/1; UG/1; UG/1
POWDER RESPIRATORY (INHALATION)
Qty: 60 EACH | Refills: 3 | Status: SHIPPED | OUTPATIENT
Start: 2025-01-02

## 2025-01-02 NOTE — PROGRESS NOTES
"SUBJECTIVE:  Sagrario Waller is a 49 y.o. female here for Cough, Sore Throat, Nasal Congestion, and Generalized Body Aches      HPI  Patient complains of cough, congestion and sore throat. Also complains of body aches. Also having headaches. States that symptoms started yesterday. Has been taking sudafed. No improvements. Patient denies to getting flu vaccine this year.    JAMES's allergies, medications, history, and problem list were updated as appropriate.    Review of Systems   Constitutional:  Positive for fatigue and fever.   HENT:  Positive for ear pain, sinus pressure, sinus pain and sneezing.    Musculoskeletal:  Positive for myalgias.      A comprehensive review of symptoms was completed and negative except as noted above.    OBJECTIVE:  Vital signs  Vitals:    01/02/25 1022   BP: 112/62   BP Location: Left arm   Patient Position: Sitting   Pulse: (!) 114   Resp: 20   Temp: 99.8 °F (37.7 °C)   SpO2: 97%   Weight: 117 kg (258 lb)   Height: 5' 7" (1.702 m)        Physical Exam  Vitals and nursing note reviewed.   Constitutional:       Appearance: Normal appearance.   HENT:      Head: Normocephalic and atraumatic.      Right Ear: Ear canal and external ear normal. A middle ear effusion is present.      Left Ear: Ear canal and external ear normal. A middle ear effusion is present.      Nose: Nose normal.      Mouth/Throat:      Mouth: Mucous membranes are moist.      Pharynx: Oropharynx is clear. Posterior oropharyngeal erythema present.   Eyes:      Extraocular Movements: Extraocular movements intact.      Conjunctiva/sclera: Conjunctivae normal.      Pupils: Pupils are equal, round, and reactive to light.   Cardiovascular:      Rate and Rhythm: Normal rate and regular rhythm.      Pulses: Normal pulses.      Heart sounds: Normal heart sounds.   Pulmonary:      Effort: Pulmonary effort is normal.      Breath sounds: Normal breath sounds.   Abdominal:      General: Abdomen is flat. Bowel sounds are normal.      " Palpations: Abdomen is soft.   Musculoskeletal:         General: Normal range of motion.      Cervical back: Normal range of motion.   Skin:     General: Skin is warm and dry.      Capillary Refill: Capillary refill takes less than 2 seconds.   Neurological:      General: No focal deficit present.      Mental Status: She is alert.   Psychiatric:         Mood and Affect: Mood normal.         Behavior: Behavior normal.         Thought Content: Thought content normal.         Judgment: Judgment normal.          Office Visit on 01/02/2025   Component Date Value Ref Range Status    SARS Coronavirus 2 Antigen 01/02/2025 Negative  Negative Final     Acceptable 01/02/2025 Yes   Final    Rapid Strep A Screen 01/02/2025 Negative  Negative Final     Acceptable 01/02/2025 Yes   Final    Rapid Influenza A Ag 01/02/2025 Positive (A)  Negative Final    Rapid Influenza B Ag 01/02/2025 Negative  Negative Final     Acceptable 01/02/2025 Yes   Final          ASSESSMENT/PLAN:    1. Influenza A  Assessment & Plan:  Tamiflu bid x5days   Ibuprofen, tylenol, rest, hydrate, flonase, nasal irrigation   treating the symptoms of i?fl??nz? can help you to feel better but will not make the fl? go away faster.  ?Rest until the fl? is fully resolved, especially if the illness has been severe.  ?Fluids - Drink enough fluids so that you do not become dehydrated. One way to  if you are drinking enough is to look at the color of your urine. Normally, urine should be light yellow to nearly colorless. If you are drinking enough, you should pass urine every three to five hours.  ?Acetaminophen (sample brand name: Tylenol) can relieve fever, headache, and muscle aches. Aspirin and medicines that include aspirin (eg, bismuth subsalicylate [sample brand name: Pepto-Bismol]) are not recommended for children under 18 because aspirin can lead to a serious disease called Reye syndrome.  ?C?ugh medicines are not  usually helpful; ??ugh usually resolves without treatment.     Orders:  -     oseltamivir (TAMIFLU) 75 MG capsule; Take 1 capsule (75 mg total) by mouth 2 (two) times daily. for 5 days  Dispense: 10 capsule; Refill: 0    2. Cough, unspecified type  -     SARS Coronavirus 2 Antigen, POCT Manual Read  -     POCT rapid strep A  -     POCT Influenza A/B    3. Mild intermittent asthma without complication  -     albuterol (PROVENTIL/VENTOLIN HFA) 90 mcg/actuation inhaler; INHALE TWO(2) PUFFS INTO THE LUNGS 4 TIMES DAILY AS NEEDED FOR WHEEZING /SHAKE WELL  Dispense: 6.7 g; Refill: 0          Recent Results (from the past 24 hours)   POCT Influenza A/B    Collection Time: 01/02/25 10:30 AM   Result Value Ref Range    Rapid Influenza A Ag Positive (A) Negative    Rapid Influenza B Ag Negative Negative     Acceptable Yes    SARS Coronavirus 2 Antigen, POCT Manual Read    Collection Time: 01/02/25 10:44 AM   Result Value Ref Range    SARS Coronavirus 2 Antigen Negative Negative     Acceptable Yes    POCT rapid strep A    Collection Time: 01/02/25 10:44 AM   Result Value Ref Range    Rapid Strep A Screen Negative Negative     Acceptable Yes        Follow Up:  Follow up if symptoms worsen or fail to improve.    If a referral was sent and you are not notified and scheduled with specialist within 2 weeks, please notify office to ensure specialty appointment is scheduled in a timely manner.   Discussed the diagnosis, prognosis, plan of care, chronic nature of and indications for, risks and benefits of treatment for conditions.  Continue all medications as prescribed unless otherwise noted.   Call if patient develops other symptoms or if not better in 2-3 days and sooner if worse. Emphasized the importance of compliance with all recommendations, including medication use and timely follow up. Instructed to return as noted be or sooner if patient develops any other problems or if there are  any other additional questions or concerns.

## 2025-01-02 NOTE — ASSESSMENT & PLAN NOTE
Tamiflu bid x5days   Ibuprofen, tylenol, rest, hydrate, flonase, nasal irrigation   treating the symptoms of i?fl??nz? can help you to feel better but will not make the fl? go away faster.  ?Rest until the fl? is fully resolved, especially if the illness has been severe.  ?Fluids - Drink enough fluids so that you do not become dehydrated. One way to  if you are drinking enough is to look at the color of your urine. Normally, urine should be light yellow to nearly colorless. If you are drinking enough, you should pass urine every three to five hours.  ?Acetaminophen (sample brand name: Tylenol) can relieve fever, headache, and muscle aches. Aspirin and medicines that include aspirin (eg, bismuth subsalicylate [sample brand name: Pepto-Bismol]) are not recommended for children under 18 because aspirin can lead to a serious disease called Reye syndrome.  ?C?ugh medicines are not usually helpful; ??ugh usually resolves without treatment.

## 2025-01-27 ENCOUNTER — OFFICE VISIT (OUTPATIENT)
Dept: FAMILY MEDICINE | Facility: CLINIC | Age: 50
End: 2025-01-27
Payer: COMMERCIAL

## 2025-01-27 VITALS
HEIGHT: 67 IN | OXYGEN SATURATION: 96 % | TEMPERATURE: 98 F | BODY MASS INDEX: 40.18 KG/M2 | HEART RATE: 90 BPM | RESPIRATION RATE: 20 BRPM | WEIGHT: 256 LBS | SYSTOLIC BLOOD PRESSURE: 128 MMHG | DIASTOLIC BLOOD PRESSURE: 68 MMHG

## 2025-01-27 DIAGNOSIS — E11.65 TYPE 2 DIABETES MELLITUS WITH HYPERGLYCEMIA, WITHOUT LONG-TERM CURRENT USE OF INSULIN: ICD-10-CM

## 2025-01-27 DIAGNOSIS — E11.9 COMPREHENSIVE DIABETIC FOOT EXAMINATION, TYPE 2 DM, ENCOUNTER FOR: ICD-10-CM

## 2025-01-27 DIAGNOSIS — T78.40XD ALLERGY, SUBSEQUENT ENCOUNTER: ICD-10-CM

## 2025-01-27 DIAGNOSIS — Z12.31 SCREENING MAMMOGRAM, ENCOUNTER FOR: Primary | ICD-10-CM

## 2025-01-27 DIAGNOSIS — M17.0 ARTHRITIS OF BOTH KNEES: ICD-10-CM

## 2025-01-27 DIAGNOSIS — J30.0 VASOMOTOR RHINITIS: ICD-10-CM

## 2025-01-27 DIAGNOSIS — K21.9 GASTROESOPHAGEAL REFLUX DISEASE WITHOUT ESOPHAGITIS: ICD-10-CM

## 2025-01-27 DIAGNOSIS — E78.5 HYPERLIPIDEMIA LDL GOAL <70: ICD-10-CM

## 2025-01-27 DIAGNOSIS — I10 PRIMARY HYPERTENSION: ICD-10-CM

## 2025-01-27 PROCEDURE — 1160F RVW MEDS BY RX/DR IN RCRD: CPT | Mod: CPTII,,, | Performed by: NURSE PRACTITIONER

## 2025-01-27 PROCEDURE — 3078F DIAST BP <80 MM HG: CPT | Mod: CPTII,,, | Performed by: NURSE PRACTITIONER

## 2025-01-27 PROCEDURE — 3044F HG A1C LEVEL LT 7.0%: CPT | Mod: CPTII,,, | Performed by: NURSE PRACTITIONER

## 2025-01-27 PROCEDURE — 1159F MED LIST DOCD IN RCRD: CPT | Mod: CPTII,,, | Performed by: NURSE PRACTITIONER

## 2025-01-27 PROCEDURE — 3008F BODY MASS INDEX DOCD: CPT | Mod: CPTII,,, | Performed by: NURSE PRACTITIONER

## 2025-01-27 PROCEDURE — 99214 OFFICE O/P EST MOD 30 MIN: CPT | Mod: ,,, | Performed by: NURSE PRACTITIONER

## 2025-01-27 PROCEDURE — 3074F SYST BP LT 130 MM HG: CPT | Mod: CPTII,,, | Performed by: NURSE PRACTITIONER

## 2025-01-27 RX ORDER — LOSARTAN POTASSIUM 50 MG/1
50 TABLET ORAL NIGHTLY
Qty: 90 TABLET | Refills: 1 | Status: SHIPPED | OUTPATIENT
Start: 2025-01-27 | End: 2025-07-26

## 2025-01-27 RX ORDER — LEVOCETIRIZINE DIHYDROCHLORIDE 5 MG/1
5 TABLET, FILM COATED ORAL NIGHTLY
Qty: 30 TABLET | Refills: 11 | Status: SHIPPED | OUTPATIENT
Start: 2025-01-27 | End: 2026-01-27

## 2025-01-27 RX ORDER — MONTELUKAST SODIUM 10 MG/1
10 TABLET ORAL NIGHTLY
Qty: 90 TABLET | Refills: 1 | Status: SHIPPED | OUTPATIENT
Start: 2025-01-27

## 2025-01-27 RX ORDER — FLUTICASONE PROPIONATE 50 MCG
1 SPRAY, SUSPENSION (ML) NASAL 2 TIMES DAILY
Qty: 16 G | Refills: 3 | Status: SHIPPED | OUTPATIENT
Start: 2025-01-27

## 2025-01-27 RX ORDER — ROSUVASTATIN CALCIUM 20 MG/1
20 TABLET, COATED ORAL NIGHTLY
Qty: 90 TABLET | Refills: 1 | Status: SHIPPED | OUTPATIENT
Start: 2025-01-27

## 2025-01-27 RX ORDER — MELOXICAM 15 MG/1
15 TABLET ORAL DAILY PRN
Qty: 28 TABLET | Refills: 1 | Status: SHIPPED | OUTPATIENT
Start: 2025-01-27

## 2025-01-27 NOTE — ASSESSMENT & PLAN NOTE
Mounjaro 15 mg weekly, add jardiance 25 mg daily. Recheck hgb A1c in 3 months. Continuing to improve.   Hemoglobin A1c   Date Value Ref Range Status   01/13/2025 6.6 (H) 4.8 - 5.6 % Final     Comment:              Prediabetes: 5.7 - 6.4           Diabetes: >6.4           Glycemic control for adults with diabetes: <7.0     10/07/2024 6.9 (H) 4.8 - 5.6 % Final     Comment:              Prediabetes: 5.7 - 6.4           Diabetes: >6.4           Glycemic control for adults with diabetes: <7.0     07/24/2024 7.1 (H) 4.8 - 5.6 % Final     Comment:              Prediabetes: 5.7 - 6.4           Diabetes: >6.4           Glycemic control for adults with diabetes: <7.0     05/03/2022 7.2 4.8 - 5.6    11/30/2021 6.6 (H) 4.8 - 5.6 % Final   07/12/2021 6.3 (H) 4.8 - 5.6 % Final

## 2025-01-27 NOTE — PROGRESS NOTES
Patient ID: Sagrario Waller  : 1975     Chief Complaint: review labs    Allergies: Patient is allergic to sulfa (sulfonamide antibiotics).     History of Present Illness:  The patient is a 49 y.o. White female who presents to clinic for evaluation and management with a chief complaint of review labs   HPI patient returns for follow-up with her chronic conditions and her recent labs.  She has not been experiencing any problems with her asthma lately but does have a postnasal drip.  Not having any sign of breathing problems but does still take the module row 15 every week and is finding that she experiences alternating constipation and diarrhea but not enough to stop taking medication.  Experiencing more knee pain especially of the right knee with extended walking 12 hour shifts and would like to try to taking NSAIDs no past history of GI bleed.  She also feels numbness in her great toes periodically at night.  Past Medical History:  has a past medical history of ADHD (attention deficit hyperactivity disorder), Anxiety, Arthritis, Asthma, COPD (chronic obstructive pulmonary disease), Depression, Diabetes mellitus, type 2, GERD (gastroesophageal reflux disease), and Hypertension.    Social History:  reports that she has quit smoking. Her smoking use included cigarettes. She has never been exposed to tobacco smoke. She has never used smokeless tobacco. She reports current alcohol use. She reports that she does not use drugs.    Care Team: Patient Care Team:  Genesis Lopez DNP as PCP - General (Family Medicine)     Current Medications:  Current Outpatient Medications   Medication Instructions    albuterol (PROVENTIL/VENTOLIN HFA) 90 mcg/actuation inhaler INHALE TWO(2) PUFFS INTO THE LUNGS 4 TIMES DAILY AS NEEDED FOR WHEEZING /SHAKE WELL    ALPRAZolam (XANAX) 2 mg, 2 times daily    azelastine (ASTELIN) 137 mcg (0.1 %) nasal spray SPRAY ONE(1) SPRAY IN EACH NOSTRIL TWICE DAILY FOR SINUS, ALLERGY, &/OR  "CONGESTION    buPROPion (WELLBUTRIN XL) 300 mg, Every morning    cholecalciferol (vitamin D3) 50,000 Units, Oral, Every 7 days    cyanocobalamin 1,000 mcg/mL injection INJECT 1 ML IN MUSCLE EVERY 14 DAYS AS DIRECTED    dextroamphetamine-amphetamine 30 mg Tab 1 tablet, 2 times daily    doxepin (SINEQUAN) 100 mg    EASY TOUCH FLIPLOCK SYRINGE 3 mL 23 gauge x 1" Syrg USE WITH B-12    flash glucose sensor (FREESTYLE MAHAD 14 DAY SENSOR) Kit CHECK BLOOD SUGAR AS DIRECTED    fluticasone propionate (FLONASE) 50 mcg, Each Nostril, 2 times daily    lactulose (CHRONULAC) 10 g, Oral, Daily PRN, Can increase to 30 mL daily if needed.    levocetirizine (XYZAL) 5 mg, Oral, Nightly    losartan (COZAAR) 50 mg, Oral, Nightly    meloxicam (MOBIC) 15 mg, Oral, Daily PRN    montelukast (SINGULAIR) 10 mg, Oral, Nightly    naloxone (NARCAN) 4 mg/actuation Spry ONE(1) SPRAY IN A NOSTRIL IN AN OPIOID EMERGENCY / MAY REPEAT IN 2-3 MINUTES AS NEEDED    OXcarbazepine (TRILEPTAL) 300 mg, 2 times daily    rosuvastatin (CRESTOR) 20 mg, Oral, Nightly    tirzepatide 15 mg, Subcutaneous, Every 7 days    TRELEGY ELLIPTA 200-62.5-25 mcg inhaler INHALE ONE(1) PUFF INTO THE LUNGS ONCE A DAY AT THE SAME TIME EACH DAY FOR BREATHING /RINSE MOUTH AFTER USE    tretinoin (RETIN-A) 0.05 % cream Topical (Top), Nightly       Review of Systems   Constitutional:  Positive for appetite change (intermittent) and fatigue. Negative for activity change, fever and night sweats.   HENT:  Positive for postnasal drip.    Respiratory:  Negative for cough, chest tightness, shortness of breath and wheezing.    Cardiovascular:  Negative for palpitations and leg swelling.   Gastrointestinal:  Positive for change in bowel habit, constipation and diarrhea. Negative for nausea and reflux.   Integumentary:  Positive for rash (right cheek, wearing CPAP night and finding helping fatigue.).   Neurological:  Positive for numbness (great toes).   Hematological: Negative.  " "  Psychiatric/Behavioral:  Positive for sleep disturbance (intermittent). Negative for behavioral problems, decreased concentration, depressed mood, dysphoric mood and suicidal ideas. The patient is not nervous/anxious.         Visit Vitals  /68 (BP Location: Left arm, Patient Position: Sitting)   Pulse 90   Temp 98 °F (36.7 °C)   Resp 20   Ht 5' 7" (1.702 m)   Wt 116.1 kg (256 lb)   SpO2 96%   BMI 40.10 kg/m²       Physical Exam  Vitals and nursing note reviewed.   Constitutional:       Appearance: Normal appearance.   HENT:      Head: Normocephalic.      Nose: Rhinorrhea present.      Mouth/Throat:      Mouth: Mucous membranes are dry.   Eyes:      Extraocular Movements: Extraocular movements intact.      Conjunctiva/sclera: Conjunctivae normal.   Cardiovascular:      Rate and Rhythm: Normal rate and regular rhythm.      Pulses: Normal pulses.      Heart sounds: Normal heart sounds.   Pulmonary:      Effort: Pulmonary effort is normal.      Breath sounds: Normal breath sounds.   Abdominal:      General: Bowel sounds are normal.      Palpations: Abdomen is soft.   Musculoskeletal:         General: No tenderness. Normal range of motion.      Cervical back: Normal range of motion.      Right knee: Crepitus present. No swelling. Normal range of motion. No tenderness.      Left knee: Crepitus present. No swelling. Normal range of motion. No tenderness.   Skin:     General: Skin is warm and dry.      Capillary Refill: Capillary refill takes less than 2 seconds.   Neurological:      General: No focal deficit present.      Mental Status: She is alert.   Psychiatric:         Mood and Affect: Mood normal.          Labs Reviewed:  Chemistry:  Lab Results   Component Value Date     01/13/2025    K 4.2 01/13/2025    BUN 9 01/13/2025    CREATININE 0.76 01/13/2025    EGFRNORACEVR 95 01/13/2025    GLUCOSE 115 (H) 11/30/2021    CALCIUM 8.9 01/13/2025    ALKPHOS 94 11/30/2021    LABPROT 7.4 11/30/2021    ALBUMIN 3.8 " 01/13/2025    AST 28 01/13/2025    ALT 25 01/13/2025    MG 2.0 01/08/2024    AZFPTMEC26EL 39.5 01/13/2025    TSH 3.030 10/07/2024        Lab Results   Component Value Date    HGBA1C 6.6 (H) 01/13/2025        Hematology:  Lab Results   Component Value Date    WBC 9.9 01/13/2025    RBC 4.85 01/13/2025    HGB 14.2 01/13/2025    HCT 44.4 01/13/2025    MCV 92 01/13/2025    MCH 29.3 01/13/2025    MCHC 32.0 01/13/2025    RDW 12.7 01/13/2025     01/13/2025    MONO 10 01/13/2025    MONO 1.0 (H) 01/13/2025    BASO 0.0 01/13/2025    EOSINOPHIL 6 01/13/2025    BASOPHIL 0 01/13/2025       Lipid Panel:  Lab Results   Component Value Date    CHOL 142 01/13/2025    HDL 42 01/13/2025    LDLCALC 75 01/13/2025    TRIG 144 01/13/2025    TOTALCHOLEST 3.9 05/03/2022        Assessment & Plan:  1. Screening mammogram, encounter for  -     Mammo Digital Screening Bilat w/ Lalit; Future; Expected date: 01/27/2025    2. Primary hypertension  Assessment & Plan:  Losartan 50 mg daily. The current medical regimen is effective;  continue present plan and medications.  As started Jardiance yet but would be willing to try starting with 1/2 of the dose for blood pressure but especially kidney protection with the diabetes.    Orders:  -     losartan (COZAAR) 50 MG tablet; Take 1 tablet (50 mg total) by mouth every evening.  Dispense: 90 tablet; Refill: 1    3. Hyperlipidemia LDL goal <70  Overview:  In October 2024 had increased rosuvastatin to 20 mg daily with low dairy low-fat diet LDL continues    Assessment & Plan:  Rosuvastatin 20 mg improved LDL to 72, goal <70, continue med with diet and add jardiance overall     Orders:  -     rosuvastatin (CRESTOR) 20 MG tablet; Take 1 tablet (20 mg total) by mouth every evening.  Dispense: 90 tablet; Refill: 1    4. Type 2 diabetes mellitus with hyperglycemia, without long-term current use of insulin  Overview:  Lost 15# since starting Mounjaro, increase to 10 mg if doing well in one month can change  to 3 months supply. Will notify with results of lab draw when available. Continue current treatment until results available.       Assessment & Plan:  Mounjaro 15 mg weekly, add jardiance 25 mg daily. Recheck hgb A1c in 3 months. Continuing to improve.   Hemoglobin A1c   Date Value Ref Range Status   01/13/2025 6.6 (H) 4.8 - 5.6 % Final     Comment:              Prediabetes: 5.7 - 6.4           Diabetes: >6.4           Glycemic control for adults with diabetes: <7.0     10/07/2024 6.9 (H) 4.8 - 5.6 % Final     Comment:              Prediabetes: 5.7 - 6.4           Diabetes: >6.4           Glycemic control for adults with diabetes: <7.0     07/24/2024 7.1 (H) 4.8 - 5.6 % Final     Comment:              Prediabetes: 5.7 - 6.4           Diabetes: >6.4           Glycemic control for adults with diabetes: <7.0     05/03/2022 7.2 4.8 - 5.6    11/30/2021 6.6 (H) 4.8 - 5.6 % Final   07/12/2021 6.3 (H) 4.8 - 5.6 % Final          5. Comprehensive diabetic foot examination, type 2 DM, encounter for  Assessment & Plan:  Protective Sensation (w/ 10 gram monofilament):  Right: Intact  Left: Intact    Visual Inspection:  Normal -  Bilateral    Pedal Pulses:   Right: Present  Left: Present    Posterior Tibialis Pulses:   Right:Present  Left: Present       6. Gastroesophageal reflux disease without esophagitis  Assessment & Plan:  Intermittent symptoms, may try pepcid daily prn symptoms.       7. Vasomotor rhinitis  -     fluticasone propionate (FLONASE) 50 mcg/actuation nasal spray; 1 spray (50 mcg total) by Each Nostril route 2 (two) times daily.  Dispense: 16 g; Refill: 3  -     levocetirizine (XYZAL) 5 MG tablet; Take 1 tablet (5 mg total) by mouth every evening.  Dispense: 30 tablet; Refill: 11    8. Arthritis of both knees  Assessment & Plan:  Wearing knee brace p.r.n. and taking the Duexis 2-3 times per week finds slight but not great help can stop Duexis and try instead meloxicam 15 mg daily if needed for severe arthritis  pain notify if any stomach problems with a and may take Pepcid over-the-counter if needed.    Orders:  -     meloxicam (MOBIC) 15 MG tablet; Take 1 tablet (15 mg total) by mouth daily as needed for Pain.  Dispense: 28 tablet; Refill: 1    9. Allergy, subsequent encounter  -     montelukast (SINGULAIR) 10 mg tablet; Take 1 tablet (10 mg total) by mouth every evening.  Dispense: 90 tablet; Refill: 1         Future Appointments   Date Time Provider Department Center   7/30/2025  8:45 AM [facility]URSE[facility]       Follow up in about 3 months (around 4/27/2025). Call sooner if needed.    Genesis Lopez, JR    Lab Frequency Next Occurrence   Mammo Digital Diagnostic Bilat with Lalit Once 10/04/2023   US Breast Left Limited Once 10/04/2023   Ambulatory referral/consult to ENT Once 10/16/2024

## 2025-01-27 NOTE — ASSESSMENT & PLAN NOTE
Losartan 50 mg daily. The current medical regimen is effective;  continue present plan and medications.  As started Jardiance yet but would be willing to try starting with 1/2 of the dose for blood pressure but especially kidney protection with the diabetes.

## 2025-01-27 NOTE — ASSESSMENT & PLAN NOTE
Rosuvastatin 20 mg improved LDL to 72, goal <70, continue med with diet and add jardiance overall

## 2025-01-27 NOTE — ASSESSMENT & PLAN NOTE
Wearing knee brace p.r.n. and taking the Duexis 2-3 times per week finds slight but not great help can stop Duexis and try instead meloxicam 15 mg daily if needed for severe arthritis pain notify if any stomach problems with a and may take Pepcid over-the-counter if needed.

## 2025-02-04 DIAGNOSIS — B00.9 HERPES SIMPLEX INFECTION: Primary | ICD-10-CM

## 2025-02-04 RX ORDER — VALACYCLOVIR HYDROCHLORIDE 1 G/1
TABLET, FILM COATED ORAL
Qty: 21 TABLET | Refills: 2 | Status: SHIPPED | OUTPATIENT
Start: 2025-02-04

## 2025-03-25 NOTE — PROGRESS NOTES
Chief Complaint:   Well Woman     History of Present Illness:  Sagrario Waller is a 49 y.o. year old  presents for her well woman exam. Currently relying on tubal for birth control. No LMP recorded. Patient has had an ablation.. No cycle since ablation. No complaints today.     PMH of abnormal mammogram, managed by Genesis Lopez, PCP. Last breast imaging was 2023, birads 3. Sees PCP in May of this year.         Gyn History:  Menstrual History  Cycle: No  Menarche Age: 1 years  No Cycle Reason: (!) Surgical  Surgical Reason: ablation    Menopause  Menopause Age: 1 years  Post Menopausal Bleeding: No  Hormone Replacement Therapy: No    Pap History  Last pap date: 22  Result: Normal (NIL. Neg HPV)  History of Abnormal Pap: No  HPV Vaccine Completed: No    [place]  Sexually Active: No  STI History: Yes  STI Type: GC  Contraception: Yes  Contraception Type: Tubal ligation/salpingectony    Breast History  Last Breast Imaging Date: Yes  Date: 09/10/22 (Benign)  History of Abnormal Breast Imaging : No  History of Breast Biopsy: No        Review of Systems:  General/Constitutional: Chills denies. Fatigue/weakness denies. Fever denies. Night sweats denies. Hot flashes denies    Respiratory: Cough denies. Hemoptysis denies. SOB denies. Sputum production denies. Wheezing denies .   Cardiovascular: Chest pain denies. Dizziness denies. Palpitations denies. Swelling in hands/feet denies.                Breast: Dimpling denies. Breast mass denies. Breast pain/tenderness denies. Nipple discharge denies. Puckering denies.  Gastrointestinal: Abdominal pain denies. Blood in stool denies. Constipation denies. Diarrhea denies. Heartburn denies. Nausea denies. Vomiting denies    Genitourinary: Incontinence denies. Blood in urine denies. Frequent urination denies. Painful urination denies. Urinary urgency denies. Nocturia denies    Gynecologic: Irregular menses denies. Heavy bleeding denies. Painful menses denies.  Vaginal discharge denies. Vaginal odor denies. Vaginal itching denies. Vaginal lesion denies. Pelvic pain denies. Decreased libido denies. Vulvar lesion denies. Prolapse of genital organs denies. Painful intercourse denies. Postcoital bleeding denies    Psychiatric: Depression denies. Anxiety denies.     OB History    Para Term  AB Living   3 3 3 0 0 2   SAB IAB Ectopic Multiple Live Births   0 0 0 0 2      # 1 - Date: [birth year], Sex: Male, Weight: 4.111 kg (9 lb 1 oz), GA: None, Type: Vaginal, Spontaneous, Apgar1: None, Apgar5: None, Living: Living, Birth Comments: None    # 2 - Date: [birth year], Sex: Male, Weight: 2.948 kg (6 lb 8 oz), GA: None, Type: Vaginal, Spontaneous, Apgar1: None, Apgar5: None, Living: None, Birth Comments: None    # 3 - Date: 03/15[birth year]99, Sex: Female, Weight: 3.033 kg (6 lb 11 oz), GA: None, Type: Vaginal, Spontaneous, Apgar1: None, Apgar5: None, Living: Living, Birth Comments: None      Past Medical History:   Diagnosis Date    ADHD (attention deficit hyperactivity disorder)     Anxiety     Arthritis     Asthma     COPD (chronic obstructive pulmonary disease)     Depression     Diabetes mellitus, type 2     GERD (gastroesophageal reflux disease)     Hypertension     LYNSEY (obstructive sleep apnea)     Urinary incontinence      Current Medications[1]    Review of patient's allergies indicates:   Allergen Reactions    Sulfa (sulfonamide antibiotics)        Past Surgical History:   Procedure Laterality Date    ABDOMINAL SURGERY      Tubal ligation    APPENDECTOMY      CHOLECYSTECTOMY  11/15/2021    meniscus Bilateral 2017    repair    PANNICULECTOMY      TUBAL LIGATION      uterine ablation       Family History   Problem Relation Name Age of Onset    Hodgkin's lymphoma Mother Miranda     Diabetes Mother Miranda     Early death Mother Miranda     Diabetes Father Ricardo Kolby     Hypertension Father Ricardo Kolby     Alcohol abuse Father Ricardo Kolby     Hearing loss Father Ricardo  "Kolby     Heart disease Father Ricardo Rebolledomier     Breast cancer Maternal Grandmother Margaret Oconnell         unsure age    Uterine cancer Neg Hx       Social History[2]    Physical Exam:  /68 (BP Location: Right arm, Patient Position: Sitting)   Ht 5' 7" (1.702 m)   Wt 110.2 kg (243 lb)   BMI 38.06 kg/m²     Chaperone: present.       General appearance: healthy, well-nourished and well-developed     Psychiatric: Orientation to time, place and person. Normal mood and affect and active, alert     Skin: Appearance: no rashes or lesions.     Neck:   Neck: supple, FROM, trachea midline. and no masses   Thyroid: no enlargement or nodules and non-tender.       Cardiovascular:   Auscultation: RRR and no murmur.   Peripheral Vascular: no varicosities, LLE edema, RLE edema, calf tenderness, and palpable cord and pedal pulses intact.     Lungs:   Respiratory effort: no intercostal retractions or accessory muscle usage.   Auscultation: no wheezing, rales/crackles, or rhonchi and clear to auscultation.     Breast:   Inspection/Palpation: no tenderness, discrete/distinct masses, skin changes, or abnormal secretions. Nipple appearance normal.     Abdomen:   Auscultation/Inspection/Palpation: no hepatomegaly, splenomegaly, masses, tenderness or CVA tenderness and soft, non-distended bowel sounds preset.    Hernia: no palpable hernias.     Female Genitalia:    Vulva: no masses, tenderness or lesions    Bladder/Urethra: no urethral discharge or mass, normal meatus, bladder non-distended.    Vagina: no tenderness, erythema, cystocele, rectocele, abnormal vaginal discharge or vesicle(s) or ulcers    Cervix: no discharge, no cervical lacerations noted or motion tenderness and grossly normal    Uterus: normal size and shape and midline, non-tender, and no uterine prolapse.    Adnexa/Parametria: no parametrial tenderness or mass, no adnexal tenderness or ovarian mass.     Lymph Nodes:   Palpation: non tender submandibular " nodes, axillary nodes, or inguinal nodes.     Rectal Exam:   Rectum: normal perianal skin.       Assessment/Plan:  1. Well woman exam  Pap   Recommend BSE monthly   Discussed recommendations of annual screening after age of 40 with mammogram  Explained that screening is not 100% reliable. Advised patient if she notices any changes to her breast including a lump, mass, dimpling, discharge, rash, or tenderness she should contact us immediately.     Healthy diet, exercise   MMG  Multivitamin   Seat belt   Sunscreen use   Safe sex/ STI education  Contraception: tubal/ablation  Annual labs: PCP, Genesis Lopez  Gardasil: 0/3   Colonoscopy: 4/2018 negative, repeat 10 years    2. History of abnormal mammogram  Educated  Reviewed breast imaging w/ pt  Managed by Genesis Lopez, PCP  Follow up with Genesis in May, will see patient the week after in our office      Discussed recommendations of annual screening after age of 40 with mammogram and MRI for patients with lifetime risk greater than 25%       Explained that screening is not 100% reliable. Advised patient if she notices any changes to her breast including a lump, mass, dimpling, discharge, rash, or tenderness she should contact us immediately.       Recommend BSE monthly       L Dx MMG and L breast US 11/7/23  MAMMOGRAPHIC FINDINGS:   No new suspicious masses, calcifications, or architectural distortion are identified. Similar circumscribed left breast masses involving the approximate 6:00 position of the left breast and upper inner quadrant of the left breast.     SONOGRAPHIC FINDINGS:   The circumscribed left breast mass at 5:00, 3 cm from the nipple measures 2.0 x 0.9 x 2.4 cm, previously 2.1 x 0.6 x 2.2 cm, essentially unchanged.     The circumscribed and lobulated left breast mass at 11:00, 1 cm from nipple measures 11 x 6 x 12 mm, previously 1.3 x 0.5 x 1.2 cm, essentially unchanged. Essentially stable probably benign left breast masses at 11:00 and at 5:00.      BI-RADS Category 3:   PROBABLY BENIGN     RECOMMENDATIONS:   Recommend follow-up bilateral diagnostic mammogram and targeted left breast ultrasound in March 2024 for annual mammography and to conclude the 2 year surveillance process of the probably benign left breast masses.     Findings and recommendations were discussed directly with the patient after exam completion.     B Dx MMG and L breast US 3/27/23  MAMMOGRAPHIC FINDINGS:     DENSITY:There are scattered fibroglandular densities. No new suspicious masses, microcalcifications, or other abnormalities are seen within the right or left breast. Stable oval circumscribed masses within the left breast at 5:00 and 11:00.     SONOGRAPHIC FINDINGS:   Stable oval circumscribed mass seen within the left breast at 5:00, 3 cm from nipple measuring 16 x 7 x 21 mm.     Oval circumscribed hypoechoic mass seen within the left breast at 11:00, 1 cm from the nipple measuring 11 x 6 x 13 mm.     The masses retain their benign imaging features.     IMPRESSION:   No new suspicious masses, microcalcifications, or other abnormalities are   seen within the right or left breast.     Stable oval circumscribed masses within the left breast at 5:00 and 11:00 which retain their benign imaging features.     BI-RADS Category 3:   PROBABLY BENIGN     The patient was notified of the results and recommendations prior to discharge       3. BMI 38.0-38.9,adult  Weight loss w/ healthy diet and exercise modifications           This note was transcribed by Valentina Campos MA. There may be transcription errors as a result, however minimal. I agree with transcription and every effort has been made to ensure accuracy of transcription, but any obvious errors or omissions should be clarified with the author of the document.             [1]   Current Outpatient Medications:     albuterol (PROVENTIL/VENTOLIN HFA) 90 mcg/actuation inhaler, INHALE TWO(2) PUFFS INTO THE LUNGS 4 TIMES DAILY AS NEEDED FOR  "WHEEZING /SHAKE WELL, Disp: 6.7 g, Rfl: 0    ALPRAZolam (XANAX) 2 MG Tab, Take 2 mg by mouth 2 (two) times daily., Disp: , Rfl:     azelastine (ASTELIN) 137 mcg (0.1 %) nasal spray, SPRAY ONE(1) SPRAY IN EACH NOSTRIL TWICE DAILY FOR SINUS, ALLERGY, &/OR CONGESTION, Disp: 30 mL, Rfl: 2    buPROPion (WELLBUTRIN XL) 300 MG 24 hr tablet, Take 300 mg by mouth every morning., Disp: , Rfl:     cholecalciferol, vitamin D3, 1,250 mcg (50,000 unit) capsule, Take 1 capsule (50,000 Units total) by mouth every 7 days., Disp: 12 capsule, Rfl: 1    cyanocobalamin 1,000 mcg/mL injection, INJECT 1 ML IN MUSCLE EVERY 14 DAYS AS DIRECTED, Disp: 2 mL, Rfl: 1    dextroamphetamine-amphetamine 30 mg Tab, Take 1 tablet by mouth 2 (two) times a day., Disp: , Rfl:     doxepin (SINEQUAN) 100 MG capsule, Take 100 mg by mouth., Disp: , Rfl:     EASY TOUCH FLIPLOCK SYRINGE 3 mL 23 gauge x 1" Syrg, USE WITH B-12, Disp: 2 each, Rfl: 1    flash glucose sensor (FREESTYLE MAHAD 14 DAY SENSOR) Kit, CHECK BLOOD SUGAR AS DIRECTED, Disp: 1 kit, Rfl: 4    fluticasone propionate (FLONASE) 50 mcg/actuation nasal spray, 1 spray (50 mcg total) by Each Nostril route 2 (two) times daily., Disp: 16 g, Rfl: 3    lactulose (CHRONULAC) 20 gram/30 mL Soln, Take 15 mLs (10 g total) by mouth daily as needed (constipation). Can increase to 30 mL daily if needed., Disp: 480 mL, Rfl: 1    levocetirizine (XYZAL) 5 MG tablet, Take 1 tablet (5 mg total) by mouth every evening., Disp: 30 tablet, Rfl: 11    losartan (COZAAR) 50 MG tablet, Take 1 tablet (50 mg total) by mouth every evening., Disp: 90 tablet, Rfl: 1    montelukast (SINGULAIR) 10 mg tablet, Take 1 tablet (10 mg total) by mouth every evening., Disp: 90 tablet, Rfl: 1    naloxone (NARCAN) 4 mg/actuation [place], , Disp: , Rfl:     OXcarbazepine (TRILEPTAL) 300 MG Tab, Take 300 mg by mouth 2 (two) times daily., Disp: , Rfl:     tirzepatide 15 mg/0.5 mL PnIj, Inject 15 mg into the skin every 7 days., Disp: 4 Pen, Rfl: " 5    TRELEGY ELLIPTA 200-62.5-25 mcg inhaler, INHALE ONE(1) PUFF INTO THE LUNGS ONCE A DAY AT THE SAME TIME EACH DAY FOR BREATHING /RINSE MOUTH AFTER USE, Disp: 60 each, Rfl: 3    tretinoin (RETIN-A) 0.05 % cream, Apply topically nightly., Disp: 20 g, Rfl: 1    valACYclovir (VALTREX) 1000 MG tablet, TAKE ONE(1) TAB BY MOUTH 3 TIMES DAILY WITH FOOD UNTIL ALL TAKEN FOR VIRAL INFECTION, Disp: 21 tablet, Rfl: 2    rosuvastatin (CRESTOR) 20 MG tablet, Take 1 tablet (20 mg total) by mouth every evening. (Patient not taking: Reported on 3/28/2025), Disp: 90 tablet, Rfl: 1  [2]   Social History  Socioeconomic History    Marital status:    Tobacco Use    Smoking status: Former     Current packs/day: 0.00     Average packs/day: 1 pack/day for 10.2 years (10.2 ttl pk-yrs)     Types: Cigarettes     Start date: 1989     Quit date: 3/19/1999     Years since quittin.0     Passive exposure: Never    Smokeless tobacco: Never   Substance and Sexual Activity    Alcohol use: Not Currently    Drug use: Never    Sexual activity: Not Currently     Partners: Male     Birth control/protection: Abstinence     Comment: Uterine ablation     Social Drivers of Health     Financial Resource Strain: Medium Risk (2024)    Overall Financial Resource Strain (CARDIA)     Difficulty of Paying Living Expenses: Somewhat hard   Food Insecurity: Patient Declined (2024)    Hunger Vital Sign     Worried About Running Out of Food in the Last Year: Patient declined     Ran Out of Food in the Last Year: Patient declined   Transportation Needs: No Transportation Needs (3/28/2023)    PRAPARE - Transportation     Lack of Transportation (Medical): No     Lack of Transportation (Non-Medical): No   Physical Activity: Inactive (2024)    Exercise Vital Sign     Days of Exercise per Week: 0 days     Minutes of Exercise per Session: 0 min   Stress: Stress Concern Present (2024)    [employer] [place] [employer] -  Occupational Stress Questionnaire     Feeling of Stress : Very much   Housing Stability: Low Risk  (3/28/2023)    Housing Stability Vital Sign     Unable to Pay for Housing in the Last Year: No     Number of Places Lived in the Last Year: 1     Unstable Housing in the Last Year: No

## 2025-03-28 ENCOUNTER — OFFICE VISIT (OUTPATIENT)
Dept: OBSTETRICS AND GYNECOLOGY | Facility: CLINIC | Age: 50
End: 2025-03-28
Payer: COMMERCIAL

## 2025-03-28 VITALS
SYSTOLIC BLOOD PRESSURE: 106 MMHG | BODY MASS INDEX: 38.14 KG/M2 | DIASTOLIC BLOOD PRESSURE: 68 MMHG | HEIGHT: 67 IN | WEIGHT: 243 LBS

## 2025-03-28 DIAGNOSIS — Z12.4 CERVICAL CANCER SCREENING: ICD-10-CM

## 2025-03-28 DIAGNOSIS — Z01.419 WELL WOMAN EXAM: Primary | ICD-10-CM

## 2025-03-28 DIAGNOSIS — Z87.898 HISTORY OF ABNORMAL MAMMOGRAM: ICD-10-CM

## 2025-04-29 ENCOUNTER — RESULTS FOLLOW-UP (OUTPATIENT)
Dept: FAMILY MEDICINE | Facility: CLINIC | Age: 50
End: 2025-04-29

## 2025-05-05 ENCOUNTER — DOCUMENTATION ONLY (OUTPATIENT)
Dept: FAMILY MEDICINE | Facility: CLINIC | Age: 50
End: 2025-05-05

## 2025-05-05 ENCOUNTER — OFFICE VISIT (OUTPATIENT)
Dept: FAMILY MEDICINE | Facility: CLINIC | Age: 50
End: 2025-05-05
Payer: COMMERCIAL

## 2025-05-05 VITALS
HEIGHT: 67 IN | OXYGEN SATURATION: 96 % | TEMPERATURE: 98 F | WEIGHT: 250 LBS | RESPIRATION RATE: 20 BRPM | SYSTOLIC BLOOD PRESSURE: 112 MMHG | DIASTOLIC BLOOD PRESSURE: 62 MMHG | HEART RATE: 92 BPM | BODY MASS INDEX: 39.24 KG/M2

## 2025-05-05 DIAGNOSIS — K59.04 CHRONIC IDIOPATHIC CONSTIPATION: ICD-10-CM

## 2025-05-05 DIAGNOSIS — Z12.31 SCREENING MAMMOGRAM, ENCOUNTER FOR: Primary | ICD-10-CM

## 2025-05-05 DIAGNOSIS — B00.9 HERPES SIMPLEX INFECTION: ICD-10-CM

## 2025-05-05 DIAGNOSIS — G47.33 OSA (OBSTRUCTIVE SLEEP APNEA): ICD-10-CM

## 2025-05-05 DIAGNOSIS — D51.8 VITAMIN B12 DEFICIENCY (DIETARY) ANEMIA: ICD-10-CM

## 2025-05-05 DIAGNOSIS — J30.0 VASOMOTOR RHINITIS: ICD-10-CM

## 2025-05-05 DIAGNOSIS — E66.01 SEVERE OBESITY (BMI 35.0-39.9) WITH COMORBIDITY: ICD-10-CM

## 2025-05-05 DIAGNOSIS — E11.65 TYPE 2 DIABETES MELLITUS WITH HYPERGLYCEMIA, WITHOUT LONG-TERM CURRENT USE OF INSULIN: Primary | ICD-10-CM

## 2025-05-05 DIAGNOSIS — J45.20 MILD INTERMITTENT ASTHMA WITHOUT COMPLICATION: ICD-10-CM

## 2025-05-05 DIAGNOSIS — Z12.39 ENCOUNTER FOR SCREENING BREAST EXAMINATION: ICD-10-CM

## 2025-05-05 DIAGNOSIS — Z12.31 SCREENING MAMMOGRAM, ENCOUNTER FOR: ICD-10-CM

## 2025-05-05 DIAGNOSIS — I10 PRIMARY HYPERTENSION: ICD-10-CM

## 2025-05-05 DIAGNOSIS — E78.5 HYPERLIPIDEMIA LDL GOAL <70: ICD-10-CM

## 2025-05-05 DIAGNOSIS — M17.0 ARTHRITIS OF BOTH KNEES: ICD-10-CM

## 2025-05-05 LAB
LEFT EYE DM RETINOPATHY: NEGATIVE
RIGHT EYE DM RETINOPATHY: NEGATIVE

## 2025-05-05 PROCEDURE — 4010F ACE/ARB THERAPY RXD/TAKEN: CPT | Mod: CPTII,,, | Performed by: NURSE PRACTITIONER

## 2025-05-05 PROCEDURE — 3008F BODY MASS INDEX DOCD: CPT | Mod: CPTII,,, | Performed by: NURSE PRACTITIONER

## 2025-05-05 PROCEDURE — 3044F HG A1C LEVEL LT 7.0%: CPT | Mod: CPTII,,, | Performed by: NURSE PRACTITIONER

## 2025-05-05 PROCEDURE — 99214 OFFICE O/P EST MOD 30 MIN: CPT | Mod: ,,, | Performed by: NURSE PRACTITIONER

## 2025-05-05 PROCEDURE — 1159F MED LIST DOCD IN RCRD: CPT | Mod: CPTII,,, | Performed by: NURSE PRACTITIONER

## 2025-05-05 PROCEDURE — 3074F SYST BP LT 130 MM HG: CPT | Mod: CPTII,,, | Performed by: NURSE PRACTITIONER

## 2025-05-05 PROCEDURE — 3078F DIAST BP <80 MM HG: CPT | Mod: CPTII,,, | Performed by: NURSE PRACTITIONER

## 2025-05-05 PROCEDURE — 1160F RVW MEDS BY RX/DR IN RCRD: CPT | Mod: CPTII,,, | Performed by: NURSE PRACTITIONER

## 2025-05-05 PROCEDURE — 2023F DILAT RTA XM W/O RTNOPTHY: CPT | Mod: CPTII,,, | Performed by: NURSE PRACTITIONER

## 2025-05-05 RX ORDER — CYANOCOBALAMIN 1000 UG/ML
1000 INJECTION, SOLUTION INTRAMUSCULAR; SUBCUTANEOUS
Qty: 2 ML | Refills: 1 | Status: SHIPPED | OUTPATIENT
Start: 2025-05-05 | End: 2025-05-06

## 2025-05-05 RX ORDER — LOSARTAN POTASSIUM 25 MG/1
25 TABLET ORAL NIGHTLY
Qty: 90 TABLET | Refills: 1 | Status: SHIPPED | OUTPATIENT
Start: 2025-05-05

## 2025-05-05 RX ORDER — VALACYCLOVIR HYDROCHLORIDE 1 G/1
1000 TABLET, FILM COATED ORAL 3 TIMES DAILY
Qty: 21 TABLET | Refills: 2 | Status: SHIPPED | OUTPATIENT
Start: 2025-05-05

## 2025-05-05 RX ORDER — ROSUVASTATIN CALCIUM 20 MG/1
10 TABLET, COATED ORAL NIGHTLY
Qty: 90 TABLET | Refills: 1 | Status: SHIPPED | OUTPATIENT
Start: 2025-05-05

## 2025-05-05 NOTE — ASSESSMENT & PLAN NOTE
Losartan decrease to 25 mg daily. The current medical regimen is effective; continue present plan and medications.

## 2025-05-05 NOTE — ASSESSMENT & PLAN NOTE
Lactulose helping symptoms of constipation. The current medical regimen is effective;  continue present plan and medications

## 2025-05-05 NOTE — ASSESSMENT & PLAN NOTE
Taking Zyrtec 10 singulair 10 mg at night and finding helping sinus drip and sore throat with postnasal drip resume the Astelin with the Flonase.

## 2025-05-05 NOTE — ASSESSMENT & PLAN NOTE
Using richie sensor and remaining less <150 usually, no low blood sugar. Mounjaro 15 mg weekly. More constipation at first but has improved, didn't start jardiance due to low blood pressure.   Hemoglobin A1c   Date Value Ref Range Status   04/28/2025 5.8 (H) 4.8 - 5.6 % Final     Comment:              Prediabetes: 5.7 - 6.4           Diabetes: >6.4           Glycemic control for adults with diabetes: <7.0     01/13/2025 6.6 (H) 4.8 - 5.6 % Final     Comment:              Prediabetes: 5.7 - 6.4           Diabetes: >6.4           Glycemic control for adults with diabetes: <7.0     10/07/2024 6.9 (H) 4.8 - 5.6 % Final     Comment:              Prediabetes: 5.7 - 6.4           Diabetes: >6.4           Glycemic control for adults with diabetes: <7.0     05/03/2022 7.2 4.8 - 5.6    11/30/2021 6.6 (H) 4.8 - 5.6 % Final   07/12/2021 6.3 (H) 4.8 - 5.6 % Final

## 2025-05-06 DIAGNOSIS — E11.9 DIABETES MELLITUS WITHOUT COMPLICATION: ICD-10-CM

## 2025-05-06 DIAGNOSIS — D51.8 VITAMIN B12 DEFICIENCY (DIETARY) ANEMIA: ICD-10-CM

## 2025-05-06 RX ORDER — CYANOCOBALAMIN 1000 UG/ML
INJECTION, SOLUTION INTRAMUSCULAR; SUBCUTANEOUS
Qty: 2 ML | Refills: 2 | Status: SHIPPED | OUTPATIENT
Start: 2025-05-06

## 2025-05-06 RX ORDER — SYRINGE,SAFETY WITH NEEDLE,3ML 23GX1"
SYRINGE, EMPTY DISPOSABLE MISCELLANEOUS
Qty: 2 EACH | Refills: 1 | Status: SHIPPED | OUTPATIENT
Start: 2025-05-06

## 2025-05-06 NOTE — PROGRESS NOTES
Patient ID: Sagrario Waller  : 1975     Chief Complaint: Follow-up (Patient presents for three month follow up and review labs. )    Allergies: Patient is allergic to sulfa (sulfonamide antibiotics).     History of Present Illness:  The patient is a 49 y.o. White female who presents to clinic for evaluation and management with a chief complaint of Follow-up (Patient presents for three month follow up and review labs. )   History of Present Illness    CHIEF COMPLAINT:  Patient presents today for follow up    DIABETES:  Her A1C has improved from 6.8 to 5.8. She uses a Webjam continuous glucose monitor for blood sugar tracking. She reports severe constipation as a side effect from Mounjaro, initially experiencing no bowel movements for 1 week or more. Currently, she has bowel movements every 2-3 days with complete emptying, managed with Dulcolax. She has not started Forsega due to low blood pressure.    HYPERTENSION:  She has not initiated Losartan as prescribed due to experiencing dizziness and perceived drop in blood pressure.    MUSCULOSKELETAL:  She reports ongoing knee and ankle arthritis pain with some improvement. She experiences significant discomfort after 12-hour periods of standing. Her knees exhibit audible cracking and palpable sensations. She uses knee braces and insoles which provide noticeable relief, reporting considerably worse pain without these supportive devices.    SLEEP:  She uses sleep apnea device consistently for approximately 8 hours nightly. She can fall asleep but has difficulty maintaining sleep.    ASTHMA AND ALLERGIES:  Her asthma has been well-controlled without recent need for rescue inhaler. She takes Zyrtec, Xyzal, and montelukast nightly for allergy management. She experiences occasional mild runny nose and uses nasal sprays as needed when breathing becomes more difficult.    SOCIAL HISTORY:  She quit smoking approximately 30 years ago.      ROS:  General: -fever, -chills,  -fatigue, -weight gain, -weight loss  Eyes: -vision changes, -redness, -discharge  ENT: -ear pain, -nasal congestion, -sore throat  Cardiovascular: -chest pain, -palpitations, -lower extremity edema  Respiratory: -cough, -shortness of breath  Gastrointestinal: -abdominal pain, -nausea, -vomiting, -diarrhea, +constipation, -blood in stool  Genitourinary: -dysuria, -hematuria, -frequency  Musculoskeletal: +joint pain, -muscle pain  Skin: -rash, -lesion  Neurological: -headache, +dizziness, -numbness, -tingling, +difficulty staying asleep  Psychiatric: -anxiety, -depression, -sleep difficulty  Allergic: +runny nose, +allergic reactions          Past Medical History:  has a past medical history of ADHD (attention deficit hyperactivity disorder), Anxiety, Arthritis, Asthma, COPD (chronic obstructive pulmonary disease), Depression, Diabetes mellitus, type 2, GERD (gastroesophageal reflux disease), Hypertension, LYNSEY (obstructive sleep apnea), and Urinary incontinence.    Social History:  reports that she quit smoking about 26 years ago. Her smoking use included cigarettes. She started smoking about 36 years ago. She has a 10.2 pack-year smoking history. She has never been exposed to tobacco smoke. She has never used smokeless tobacco. She reports that she does not currently use alcohol. She reports that she does not use drugs.    Care Team: Patient Care Team:  Genesis Lopez DNP as PCP - General (Family Medicine)     Current Medications:  Current Outpatient Medications   Medication Instructions    albuterol (PROVENTIL/VENTOLIN HFA) 90 mcg/actuation inhaler INHALE TWO(2) PUFFS INTO THE LUNGS 4 TIMES DAILY AS NEEDED FOR WHEEZING /SHAKE WELL    ALPRAZolam (XANAX) 2 mg, 2 times daily    azelastine (ASTELIN) 137 mcg (0.1 %) nasal spray SPRAY ONE(1) SPRAY IN EACH NOSTRIL TWICE DAILY FOR SINUS, ALLERGY, &/OR CONGESTION    buPROPion (WELLBUTRIN XL) 300 mg, Every morning    cholecalciferol (vitamin D3) 50,000 Units, Oral, Every 7  "days    cyanocobalamin 1,000 mcg, Subcutaneous, Every 7 days    dextroamphetamine-amphetamine 30 mg Tab 1 tablet, 2 times daily    doxepin (SINEQUAN) 100 mg    EASY TOUCH FLIPLOCK SYRINGE 3 mL 23 gauge x 1" Syrg USE WITH B-12    flash glucose sensor (FREESTYLE MAHAD 14 DAY SENSOR) Kit CHECK BLOOD SUGAR AS DIRECTED    fluticasone propionate (FLONASE) 50 mcg, Each Nostril, 2 times daily    lactulose (CHRONULAC) 10 g, Oral, Daily PRN, Can increase to 30 mL daily if needed.    levocetirizine (XYZAL) 5 mg, Oral, Nightly    losartan (COZAAR) 25 mg, Oral, Nightly    montelukast (SINGULAIR) 10 mg, Oral, Nightly    naloxone (NARCAN) 4 mg/actuation Spry     OXcarbazepine (TRILEPTAL) 300 mg, 2 times daily    rosuvastatin (CRESTOR) 10 mg, Oral, Nightly, Patient will call when she needs a refill    tirzepatide 15 mg, Subcutaneous, Every 7 days    TRELEGY ELLIPTA 200-62.5-25 mcg inhaler INHALE ONE(1) PUFF INTO THE LUNGS ONCE A DAY AT THE SAME TIME EACH DAY FOR BREATHING /RINSE MOUTH AFTER USE    tretinoin (RETIN-A) 0.05 % cream Topical (Top), Nightly    valACYclovir (VALTREX) 1,000 mg, Oral, 3 times daily       Review of Systems     Visit Vitals  /62 (BP Location: Right arm, Patient Position: Sitting)   Pulse 92   Temp 98 °F (36.7 °C)   Resp 20   Ht 5' 7" (1.702 m)   Wt 113.4 kg (250 lb)   SpO2 96%   BMI 39.16 kg/m²       Physical Exam  Vitals and nursing note reviewed.   Constitutional:       General: She is not in acute distress.     Appearance: She is obese. She is not ill-appearing.   HENT:      Head: Normocephalic and atraumatic.      Right Ear: Tympanic membrane normal.      Left Ear: Tympanic membrane normal.      Nose: Rhinorrhea present.      Mouth/Throat:      Mouth: Mucous membranes are moist.      Pharynx: Oropharynx is clear.   Eyes:      General: No scleral icterus.     Extraocular Movements: Extraocular movements intact.      Conjunctiva/sclera: Conjunctivae normal.      Pupils: Pupils are equal, round, and " reactive to light.   Neck:      Vascular: No carotid bruit.   Cardiovascular:      Rate and Rhythm: Normal rate and regular rhythm.      Pulses: Normal pulses.      Heart sounds: Normal heart sounds. No murmur heard.     No friction rub. No gallop.   Pulmonary:      Effort: Pulmonary effort is normal. No respiratory distress.      Breath sounds: Normal breath sounds. No wheezing, rhonchi or rales.   Abdominal:      General: Abdomen is flat. Bowel sounds are normal. There is no distension.      Palpations: Abdomen is soft. There is no mass.      Tenderness: There is no abdominal tenderness.   Musculoskeletal:      Cervical back: Normal range of motion and neck supple.      Right knee: Crepitus present. Decreased range of motion. No tenderness.      Left knee: Crepitus present. Decreased range of motion. No tenderness.      Right ankle: Swelling present. No tenderness. Decreased range of motion. Normal pulse.      Right Achilles Tendon: No tenderness.      Left ankle: Swelling present. No tenderness. Decreased range of motion. Normal pulse.      Left Achilles Tendon: No tenderness.        Legs:    Skin:     General: Skin is warm and dry.   Neurological:      General: No focal deficit present.      Mental Status: She is alert and oriented to person, place, and time.   Psychiatric:         Mood and Affect: Mood normal.         Behavior: Behavior normal.          Labs Reviewed:  Chemistry:  Lab Results   Component Value Date     04/28/2025    K 4.3 04/28/2025    BUN 9 04/28/2025    CREATININE 0.64 04/28/2025    EGFRNORACEVR 107 04/28/2025    CALCIUM 9.3 04/28/2025    ALKPHOS 94 11/30/2021    ALBUMIN 3.8 01/13/2025    AST 28 01/13/2025    ALT 25 01/13/2025    MG 2.0 01/08/2024    [id number] 39.5 01/13/2025    TSH 3.030 10/07/2024        Lab Results   Component Value Date    HGBA1C 5.8 (H) 04/28/2025        Hematology:  Lab Results   Component Value Date    WBC 9.9 01/13/2025    RBC 4.85 01/13/2025    HGB 14.2  01/13/2025    HCT 44.4 01/13/2025    MCV 92 01/13/2025    MCH 29.3 01/13/2025    MCHC 32.0 01/13/2025    RDW 12.7 01/13/2025     01/13/2025    MONO 10 01/13/2025    MONO 1.0 (H) 01/13/2025    BASO 0.0 01/13/2025    EOSINOPHIL 6 01/13/2025    BASOPHIL 0 01/13/2025       Lipid Panel:  Lab Results   Component Value Date    CHOL 142 01/13/2025    HDL 42 01/13/2025    LDLCALC 75 01/13/2025    TRIG 144 01/13/2025    TOTALCHOLEST 3.9 05/03/2022        Assessment & Plan:  1. Type 2 diabetes mellitus with hyperglycemia, without long-term current use of insulin  Overview:  Lost 15# since starting Mounjaro, increase to 10 mg if doing well in one month can change to 3 months supply. Will notify with results of lab draw when available. Continue current treatment until results available.       Assessment & Plan:  Using richie sensor and remaining less <150 usually, no low blood sugar. Mounjaro 15 mg weekly. More constipation at first but has improved, didn't start jardiance due to low blood pressure.   Hemoglobin A1c   Date Value Ref Range Status   04/28/2025 5.8 (H) 4.8 - 5.6 % Final     Comment:              Prediabetes: 5.7 - 6.4           Diabetes: >6.4           Glycemic control for adults with diabetes: <7.0     01/13/2025 6.6 (H) 4.8 - 5.6 % Final     Comment:              Prediabetes: 5.7 - 6.4           Diabetes: >6.4           Glycemic control for adults with diabetes: <7.0     10/07/2024 6.9 (H) 4.8 - 5.6 % Final     Comment:              Prediabetes: 5.7 - 6.4           Diabetes: >6.4           Glycemic control for adults with diabetes: <7.0     05/03/2022 7.2 4.8 - 5.6    11/30/2021 6.6 (H) 4.8 - 5.6 % Final   07/12/2021 6.3 (H) 4.8 - 5.6 % Final          2. Screening mammogram, encounter for    3. Vasomotor rhinitis  Assessment & Plan:  Taking Zyrtec 10 singulair 10 mg at night and finding helping sinus drip and sore throat with postnasal drip resume the Astelin with the Flonase.       4. Mild intermittent  asthma without complication  Assessment & Plan:  Albuterol inhaler prn. Only needed 2-3 x in past month. Has medicine if needed. Monteclast 10 daily.       5. Primary hypertension  Assessment & Plan:  Losartan decrease to 25 mg daily. The current medical regimen is effective; continue present plan and medications.     Orders:  -     losartan (COZAAR) 25 MG tablet; Take 1 tablet (25 mg total) by mouth every evening.  Dispense: 90 tablet; Refill: 1    6. Arthritis of both knees  Assessment & Plan:  Knee brace helping with ankle braces. No desire for orthopedic at this time.       7. Chronic idiopathic constipation  Assessment & Plan:  Lactulose helping symptoms of constipation. The current medical regimen is effective;  continue present plan and medications       8. LYNSEY (obstructive sleep apnea)  Assessment & Plan:  Wearing CPAP at least 8 hours night and feeling better. Does help fatigue.       9. Vitamin B12 deficiency (dietary) anemia  -     cyanocobalamin 1,000 mcg/mL injection; Inject 1 mL (1,000 mcg total) into the skin every 7 days.  Dispense: 2 mL; Refill: 1    10. Hyperlipidemia LDL goal <70  Overview:  In October 2024 had increased rosuvastatin to 20 mg daily with low dairy low-fat diet LDL continues    Assessment & Plan:  Not taking rosuvastatin 10 mg daily (had been off but will resume, check in 3 months.     Orders:  -     rosuvastatin (CRESTOR) 20 MG tablet; Take 0.5 tablets (10 mg total) by mouth every evening. Patient will call when she needs a refill  Dispense: 90 tablet; Refill: 1    11. Herpes simplex infection  -     valACYclovir (VALTREX) 1000 MG tablet; Take 1 tablet (1,000 mg total) by mouth 3 (three) times daily.  Dispense: 21 tablet; Refill: 2    12. Encounter for screening breast examination  Assessment & Plan:  Denies any breast changes recently had gyn exam with normal breast exam.  Scheduled for screening mammogram.      13. Severe obesity (BMI 35.0-39.9) with comorbidity  Assessment &  Plan:  The patient's BMI has been recorded in the chart. The patient has been provided educational materials regarding the benefits of attaining and maintaining a normal weight. We will continue to address and follow this issue during follow up visits.            Assessment & Plan    E11.65 Type 2 diabetes mellitus with hyperglycemia, without long-term current use of insulin  Z12.31 Screening mammogram, encounter for  J30.0 Vasomotor rhinitis  J45.20 Mild intermittent asthma without complication  I10 Primary hypertension  M17.0 Arthritis of both knees  K59.04 Chronic idiopathic constipation  G47.33 LYNSEY (obstructive sleep apnea)  D51.8 Vitamin B12 deficiency (dietary) anemia  E78.5 Hyperlipidemia LDL goal <70  B00.9 Herpes simplex infection    IMPRESSION:  - Assessed asthma control and allergy management, noting improvement with current medication regimen.  - Evaluated BP control and adjusted Losartan from 50 mg to 25 mg for kidney and heart protection while monitoring for side effects.  - Diabetes management significantly improved with Mounjaro therapy, discussing management strategies for constipation side effect.  - Restarted Rosuvastatin at a lower dose to address cardiovascular risk, to be taken every other day.  - Assessed sleep apnea management and current CPAP compliance, patient to maintain consistent use aiming for 8 hours per night.  - Discussed relationship between improved glucose control and potential benefits for breathing and arthritis symptoms.    J30.0 VASOMOTOR RHINITIS:  - Continue Zyrtec (cetirizine) and Astelin nasal spray for allergy management.  - Patient may increase frequency of Astelin use if symptoms worsen.    J45.20 MILD INTERMITTENT ASTHMA WITHOUT COMPLICATION:  - Continue Singulair (montelukast) for asthma control.    I10 PRIMARY HYPERTENSION:  - Adjust Losartan from 50 mg to 25 mg for kidney and heart protection.  - Monitor for side effects.    M17.0 ARTHRITIS OF BOTH KNEES:  -  Continue using knee braces and insoles for symptom management.    G47.33 LYNSEY (OBSTRUCTIVE SLEEP APNEA):  - Provided education on Valsoma's mechanism for re-establishing sleep patterns.  - Maintain consistent CPAP use, aiming for 8 hours nightly.    D51.8 VITAMIN B12 DEFICIENCY (DIETARY) ANEMIA:  - Refilled vitamin B12 injections to be administered subcutaneously weekly.    E78.5 HYPERLIPIDEMIA LDL GOAL <70:  - Restart Rosuvastatin at lower dose (every other day) to address cardiovascular risk.  - Cholesterol check in 3 months.    B00.9 HERPES SIMPLEX INFECTION:  - Refilled Valtrex to be taken 3 times daily for 7 days if outbreak occurs.          Future Appointments   Date Time Provider Department Center   5/14/2025  2:50 PM Surekha Villarreal MD [facility]   8/4/2025  7:00 AM NURSE, [facility]   8/6/2025  7:00 AM Genesis Lopez DNP [facility]   11/3/2025  7:30 AM NURSE, [facility]   4/1/2026  8:40 AM Surekha Villarreal MD [facility]       Follow up in about 3 months (around 8/5/2025). Call sooner if needed.      This note was generated with the assistance of ambient listening technology. Verbal consent was obtained by the patient and accompanying visitor(s) for the recording of patient appointment to facilitate this note. I attest to having reviewed and edited the generated note for accuracy, though some syntax or spelling errors may persist. Please contact the author of this note for any clarification.      Genesis Lopez DNP    Lab Frequency Next Occurrence   Ambulatory referral/consult to ENT Once 10/16/2024

## 2025-05-06 NOTE — ASSESSMENT & PLAN NOTE
Denies any breast changes recently had gyn exam with normal breast exam.  Scheduled for screening mammogram.

## 2025-06-04 ENCOUNTER — TELEPHONE (OUTPATIENT)
Dept: OBSTETRICS AND GYNECOLOGY | Facility: CLINIC | Age: 50
End: 2025-06-04
Payer: COMMERCIAL

## 2025-06-12 ENCOUNTER — DOCUMENTATION ONLY (OUTPATIENT)
Dept: FAMILY MEDICINE | Facility: CLINIC | Age: 50
End: 2025-06-12
Payer: COMMERCIAL

## 2025-06-12 LAB — BCS RECOMMENDATION EXT: NORMAL

## 2025-06-12 NOTE — PROGRESS NOTES
Mammogram done on 6/12/2025 - I saw your office had tried to call pt to see if she had mammogram done. Here are her results.

## 2025-06-17 NOTE — PROGRESS NOTES
Chief Complaint:  Follow-up (MMG results )    History of Present Illness:  Patient is a 49 y.o.  presents to follow up mammogram and left breast US secondary to history of abnormal mammogram. Managed by Gneesis Lopez, PCP. No complaints today.    Breast Cancer-related family history includes Breast cancer in her maternal grandmother.          Gyn History:  Menstrual History  Cycle: No  Menarche Age: 1 years  No Cycle Reason: (!) Surgical  Surgical Reason: ablation    Menopause  Menopause Age: 1 years  Post Menopausal Bleeding: No  Hormone Replacement Therapy: No    Pap History  Last pap date: 25  Result: Normal  History of Abnormal Pap: No  HPV Vaccine Completed: No    Hilo  Sexually Active: No  STI History: Yes  STI Type: GC  Contraception: Yes  Contraception Type: Tubal ligation/salpingectony    Breast History  Last Breast Imaging Date: Yes  Date: 25  History of Abnormal Breast Imaging : No  History of Breast Biopsy: No      Review of systems:  General/Constitutional: Chills denies. Fatigue/weakness denies. Fever denies. Night sweats denies. Hot flashes denies  Breast: Dimpling denies. Breast mass denies. Breast pain/tenderness denies. Nipple discharge denies. Puckering denies.  Gastrointestinal: Abdominal pain denies. Blood in stool denies. Constipation denies. Diarrhea denies. Heartburn denies. Nausea denies. Vomiting denies   Genitourinary: Incontinence denies. Blood in urine denies. Frequent urination denies. Urgency denies. Painful urination denies. Nocturia denies   Gynecologic: Irregular menses denies. Heavy bleeding denies. Painful menses denies. Vaginal discharge denies. Vaginal odor denies. Vaginal itching/Irritation denies. Vaginal lesion denies.  Pelvic pain denies. Decreased libido denies. Vulvar lesion denies. Prolapse of genital organs denies. Painful intercourse denies. Postcoital bleeding denies   Psychiatric: Mood lability denies. Depressed mood denies. Suicidal thoughts  "denies. Anxiety denies. Overwhelmed denies. Appetite normal. Energy level normal.     OB History    Para Term  AB Living   3 3 3 0 0 2   SAB IAB Ectopic Multiple Live Births   0 0 0 0 2      # 1 - Date: [birth year], Sex: Male, Weight: 4.111 kg (9 lb 1 oz), GA: None, Type: Vaginal, Spontaneous, Apgar1: None, Apgar5: None, Living: Living, Birth Comments: None    # 2 - Date: [birth year], Sex: Male, Weight: 2.948 kg (6 lb 8 oz), GA: None, Type: Vaginal, Spontaneous, Apgar1: None, Apgar5: None, Living: None, Birth Comments: None    # 3 - Date: [birth year]15[birth year], Sex: Female, Weight: 3.033 kg (6 lb 11 oz), GA: None, Type: Vaginal, Spontaneous, Apgar1: None, Apgar5: None, Living: Living, Birth Comments: None      Past Medical History:   Diagnosis Date    ADHD (attention deficit hyperactivity disorder)     Anxiety     Arthritis     Asthma     COPD (chronic obstructive pulmonary disease)     Depression     Diabetes mellitus, type 2     GERD (gastroesophageal reflux disease)     Hypertension     LYNSEY (obstructive sleep apnea)     Urinary incontinence      Current Medications[1]      Physical Exam:  /68   Temp 96.8 °F (36 °C)   Ht 5' 7" (1.702 m)   Wt 113.9 kg (251 lb)   BMI 39.31 kg/m²     Constitutional: General appearance: healthy, well-nourished and well-developed   Psychiatric:  Orientation to time, place and person. Normal mood and affect and active, alert       Assessment/Plan:  1. History of abnormal mammogram   Educated  Reviewed breast imaging w/ pt  Managed by Genesis Lopez, PCP     Discussed recommendations of annual screening after age of 40 with mammogram and MRI for patients with lifetime risk greater than 25%       Explained that screening is not 100% reliable. Advised patient if she notices any changes to her breast including a lump, mass, dimpling, discharge, rash, or tenderness she should contact us immediately.       Recommend BSE monthly   Will continue with routine screening yearly      MMG and " bilateral breast US 6/12/25  MAMMOGRAPHIC FINDINGS:   DENSITY:The breast tissue is heterogeneously dense which may obscure small masses.     No new suspicious masses, microcalcifications, or other abnormalities are seen within the right or left breast. Oval circumscribed masses are seen within the left breast at 11:00 and 5:00, not significant changed.     SONOGRAPHIC FINDINGS:   Targeted left breast ultrasound performed at 5:00, 3 cm nipple and 11:00, 1 cm from nipple.     Oval circumscribed parallel hypoechoic mass seen within the left breast at 5:00, 3 cm from the nipple measuring 20 x 9 x 20 mm, not significantly changed.     Oval subject parallel hypoechoic mass seen within the left breast at 11:00, 1 cm from nipple measuring 9 x 5 x 12 mm, not significantly changed.     IMPRESSION  No suspicious masses, microcalcifications, or other abnormalities are seen within the right or left breast.     Masses with benign imaging features within the left breast at 11:00, 1 cm from nipple and 5:00, 3 cm from the nipple without significant interval change over long-term follow-up compatible with a benign etiology.     BI-RADS 2-benign findings     L Dx MMG w/ [facility] 11/7/23  MAMMOGRAPHIC FINDINGS:   No new suspicious masses, calcifications, or architectural distortion are   identified. Similar circumscribed left breast masses involving the   approximate 6:00 position of the left breast and upper inner quadrant of   the left breast.     SONOGRAPHIC FINDINGS:   The circumscribed left breast mass at 5:00, 3 cm from the nipple measures   2.0 x 0.9 x 2.4 cm, previously 2.1 x 0.6 x 2.2 cm, essentially unchanged.     The circumscribed and lobulated left breast mass at 11:00, 1 cm from   nipple measures 11 x 6 x 12 mm, previously 1.3 x 0.5 x 1.2 cm, essentially   unchanged.     BI-RADS Category 3:   PROBABLY BENIGN     B Dx MMG and L breast US 3/27/23  MAMMOGRAPHIC FINDINGS:    DENSITY:There are scattered fibroglandular densities. No  new suspicious masses, microcalcifications, or other abnormalities are seen within the right or left breast. Stable oval circumscribed masses within the left breast at 5:00 and 11:00.      SONOGRAPHIC FINDINGS:   Stable oval circumscribed mass seen within the left breast at 5:00, 3 cm from nipple measuring 16 x 7 x 21 mm.     Oval circumscribed hypoechoic mass seen within the left breast at 11:00, 1 cm from the nipple measuring 11 x 6 x 13 mm.     The masses retain their benign imaging features.      IMPRESSION:   No new suspicious masses, microcalcifications, or other abnormalities are   seen within the right or left breast.     Stable oval circumscribed masses within the left breast at 5:00 and 11:00 which retain their benign imaging features.     BI-RADS Category 3:   PROBABLY BENIGN          This note was transcribed by Valentina Campos MA. There may be transcription errors as a result, however minimal. I agree with transcription and every effort has been made to ensure accuracy of transcription, but any obvious errors or omissions should be clarified with the author of the document.             [1]   Current Outpatient Medications:     albuterol (PROVENTIL/VENTOLIN HFA) 90 mcg/actuation inhaler, INHALE TWO(2) PUFFS INTO THE LUNGS 4 TIMES DAILY AS NEEDED FOR WHEEZING /SHAKE WELL, Disp: 6.7 g, Rfl: 0    ALPRAZolam (XANAX) 2 MG Tab, Take 2 mg by mouth 2 (two) times daily., Disp: , Rfl:     azelastine (ASTELIN) 137 mcg (0.1 %) nasal spray, SPRAY ONE(1) SPRAY IN EACH NOSTRIL TWICE DAILY FOR SINUS, ALLERGY, &/OR CONGESTION, Disp: 30 mL, Rfl: 2    BELSOMRA 20 mg Tab, Take 1 tablet by mouth nightly as needed., Disp: , Rfl:     buPROPion (WELLBUTRIN XL) 300 MG 24 hr tablet, Take 300 mg by mouth every morning., Disp: , Rfl:     clindamycin (CLEOCIN T) 1 % external solution, Apply topically every evening., Disp: , Rfl:     cyanocobalamin 1,000 mcg/mL injection, INJECT 1ML IN MUSCLE EVERY 14 DAYS AS DIRECTED, Disp: 2 mL, Rfl:  "2    dextroamphetamine-amphetamine 30 mg Tab, Take 1 tablet by mouth 2 (two) times a day., Disp: , Rfl:     doxepin (SINEQUAN) 100 MG capsule, Take 100 mg by mouth., Disp: , Rfl:     EASY TOUCH FLIPLOCK SYRINGE 3 mL 23 gauge x 1" Syrg, USE WITH B-12, Disp: 2 each, Rfl: 1    ergocalciferol (ERGOCALCIFEROL) 50,000 unit Cap, Take 50,000 Units by mouth every 7 days., Disp: , Rfl:     flash glucose sensor (FREESTYLE MAHAD 14 DAY SENSOR) Kit, CHECK BLOOD SUGAR AS DIRECTED, Disp: 1 kit, Rfl: 4    fluticasone propionate (FLONASE) 50 mcg/actuation nasal spray, 1 spray (50 mcg total) by Each Nostril route 2 (two) times daily., Disp: 16 g, Rfl: 3    hydrocortisone 2.5 % ointment, Apply topically 2 (two) times daily., Disp: , Rfl:     lactulose (CHRONULAC) 20 gram/30 mL Soln, Take 15 mLs (10 g total) by mouth daily as needed (constipation). Can increase to 30 mL daily if needed., Disp: 480 mL, Rfl: 1    levocetirizine (XYZAL) 5 MG tablet, Take 1 tablet (5 mg total) by mouth every evening., Disp: 30 tablet, Rfl: 11    losartan (COZAAR) 25 MG tablet, Take 1 tablet (25 mg total) by mouth every evening., Disp: 90 tablet, Rfl: 1    montelukast (SINGULAIR) 10 mg tablet, Take 1 tablet (10 mg total) by mouth every evening., Disp: 90 tablet, Rfl: 1    naloxone (NARCAN) 4 mg/actuation [place], , Disp: , Rfl:     OXcarbazepine (TRILEPTAL) 300 MG Tab, Take 300 mg by mouth 2 (two) times daily., Disp: , Rfl:     rosuvastatin (CRESTOR) 20 MG tablet, Take 0.5 tablets (10 mg total) by mouth every evening. Patient will call when she needs a refill, Disp: 90 tablet, Rfl: 1    tirzepatide 15 mg/0.5 mL PnIj, Inject 15 mg into the skin every 7 days., Disp: 4 Pen, Rfl: 5    TRELEGY ELLIPTA 200-62.5-25 mcg inhaler, INHALE ONE(1) PUFF INTO THE LUNGS ONCE A DAY AT THE SAME TIME EACH DAY FOR BREATHING /RINSE MOUTH AFTER USE, Disp: 60 each, Rfl: 3    tretinoin (RETIN-A) 0.05 % cream, Apply topically nightly., Disp: 20 g, Rfl: 1    valACYclovir (VALTREX) 1000 " MG tablet, Take 1 tablet (1,000 mg total) by mouth 3 (three) times daily., Disp: 21 tablet, Rfl: 2

## 2025-06-18 ENCOUNTER — OFFICE VISIT (OUTPATIENT)
Dept: OBSTETRICS AND GYNECOLOGY | Facility: CLINIC | Age: 50
End: 2025-06-18
Payer: COMMERCIAL

## 2025-06-18 VITALS
HEIGHT: 67 IN | SYSTOLIC BLOOD PRESSURE: 112 MMHG | BODY MASS INDEX: 39.39 KG/M2 | DIASTOLIC BLOOD PRESSURE: 68 MMHG | TEMPERATURE: 97 F | WEIGHT: 251 LBS

## 2025-06-18 DIAGNOSIS — Z87.898 HISTORY OF ABNORMAL MAMMOGRAM: Primary | ICD-10-CM

## 2025-06-18 PROCEDURE — 3078F DIAST BP <80 MM HG: CPT | Mod: CPTII,,, | Performed by: OBSTETRICS & GYNECOLOGY

## 2025-06-18 PROCEDURE — 1159F MED LIST DOCD IN RCRD: CPT | Mod: CPTII,,, | Performed by: OBSTETRICS & GYNECOLOGY

## 2025-06-18 PROCEDURE — 3074F SYST BP LT 130 MM HG: CPT | Mod: CPTII,,, | Performed by: OBSTETRICS & GYNECOLOGY

## 2025-06-18 PROCEDURE — 4010F ACE/ARB THERAPY RXD/TAKEN: CPT | Mod: CPTII,,, | Performed by: OBSTETRICS & GYNECOLOGY

## 2025-06-18 PROCEDURE — 99213 OFFICE O/P EST LOW 20 MIN: CPT | Mod: ,,, | Performed by: OBSTETRICS & GYNECOLOGY

## 2025-06-18 PROCEDURE — 3008F BODY MASS INDEX DOCD: CPT | Mod: CPTII,,, | Performed by: OBSTETRICS & GYNECOLOGY

## 2025-06-18 PROCEDURE — 3044F HG A1C LEVEL LT 7.0%: CPT | Mod: CPTII,,, | Performed by: OBSTETRICS & GYNECOLOGY

## 2025-06-18 RX ORDER — CLINDAMYCIN PHOSPHATE 11.9 MG/ML
SOLUTION TOPICAL NIGHTLY
COMMUNITY
Start: 2025-06-17

## 2025-06-18 RX ORDER — HYDROCORTISONE 25 MG/G
OINTMENT TOPICAL 2 TIMES DAILY
COMMUNITY
Start: 2025-06-16

## 2025-06-18 RX ORDER — SUVOREXANT 20 MG/1
1 TABLET, FILM COATED ORAL NIGHTLY PRN
COMMUNITY

## 2025-06-18 RX ORDER — ERGOCALCIFEROL 1.25 MG/1
50000 CAPSULE ORAL
COMMUNITY
Start: 2025-03-14

## 2025-06-25 DIAGNOSIS — E11.9 DIABETES MELLITUS WITHOUT COMPLICATION: ICD-10-CM

## 2025-06-25 DIAGNOSIS — E11.65 TYPE 2 DIABETES MELLITUS WITH HYPERGLYCEMIA, WITHOUT LONG-TERM CURRENT USE OF INSULIN: ICD-10-CM

## 2025-06-25 RX ORDER — TIRZEPATIDE 15 MG/.5ML
INJECTION, SOLUTION SUBCUTANEOUS
Qty: 2 ML | Refills: 5 | OUTPATIENT
Start: 2025-06-25

## 2025-06-25 RX ORDER — FLASH GLUCOSE SENSOR
KIT MISCELLANEOUS
Qty: 2 KIT | Refills: 4 | Status: SHIPPED | OUTPATIENT
Start: 2025-06-25

## 2025-07-25 DIAGNOSIS — E11.65 TYPE 2 DIABETES MELLITUS WITH HYPERGLYCEMIA, WITHOUT LONG-TERM CURRENT USE OF INSULIN: ICD-10-CM

## 2025-07-28 RX ORDER — TIRZEPATIDE 15 MG/.5ML
INJECTION, SOLUTION SUBCUTANEOUS
Qty: 2 ML | Refills: 5 | Status: SHIPPED | OUTPATIENT
Start: 2025-07-28 | End: 2025-07-30 | Stop reason: SDUPTHER

## 2025-07-29 DIAGNOSIS — E55.9 VITAMIN D DEFICIENCY: Primary | ICD-10-CM

## 2025-07-29 RX ORDER — ERGOCALCIFEROL 1.25 MG/1
50000 CAPSULE ORAL
Qty: 12 CAPSULE | Refills: 0 | Status: SHIPPED | OUTPATIENT
Start: 2025-07-29 | End: 2025-07-30 | Stop reason: SDUPTHER

## 2025-07-30 ENCOUNTER — TELEPHONE (OUTPATIENT)
Dept: FAMILY MEDICINE | Facility: CLINIC | Age: 50
End: 2025-07-30

## 2025-07-30 DIAGNOSIS — E55.9 VITAMIN D DEFICIENCY: ICD-10-CM

## 2025-07-30 DIAGNOSIS — L70.0 ACNE VULGARIS: ICD-10-CM

## 2025-07-30 DIAGNOSIS — E11.9 DIABETES MELLITUS WITHOUT COMPLICATION: ICD-10-CM

## 2025-07-30 DIAGNOSIS — E11.65 TYPE 2 DIABETES MELLITUS WITH HYPERGLYCEMIA, WITHOUT LONG-TERM CURRENT USE OF INSULIN: ICD-10-CM

## 2025-07-30 DIAGNOSIS — D51.8 VITAMIN B12 DEFICIENCY (DIETARY) ANEMIA: ICD-10-CM

## 2025-07-30 RX ORDER — CYANOCOBALAMIN 1000 UG/ML
1000 INJECTION, SOLUTION INTRAMUSCULAR; SUBCUTANEOUS
Qty: 2 ML | Refills: 2 | Status: SHIPPED | OUTPATIENT
Start: 2025-07-30

## 2025-07-30 RX ORDER — TRETINOIN 0.5 MG/G
CREAM TOPICAL NIGHTLY
Qty: 20 G | Refills: 1 | Status: SHIPPED | OUTPATIENT
Start: 2025-07-30

## 2025-07-30 RX ORDER — SYRINGE,SAFETY WITH NEEDLE,3ML 23GX1"
1 SYRINGE, EMPTY DISPOSABLE MISCELLANEOUS
Qty: 6 EACH | Refills: 1 | Status: SHIPPED | OUTPATIENT
Start: 2025-07-30

## 2025-07-30 RX ORDER — TIRZEPATIDE 15 MG/.5ML
15 INJECTION, SOLUTION SUBCUTANEOUS
Qty: 2 ML | Refills: 5 | Status: SHIPPED | OUTPATIENT
Start: 2025-07-30

## 2025-07-30 RX ORDER — ERGOCALCIFEROL 1.25 MG/1
50000 CAPSULE ORAL
Qty: 12 CAPSULE | Refills: 0 | Status: SHIPPED | OUTPATIENT
Start: 2025-07-30 | End: 2025-10-28

## 2025-07-30 RX ORDER — FLASH GLUCOSE SENSOR
KIT MISCELLANEOUS
Qty: 2 KIT | Refills: 4 | Status: SHIPPED | OUTPATIENT
Start: 2025-07-30

## 2025-07-30 NOTE — TELEPHONE ENCOUNTER
----- Message from Nkechi sent at 7/30/2025  8:01 AM CDT -----  Regarding: Refill request correction  She went into her portal and requested a refill on her Tretinoin and asked it be sent to [facility] but she needs it sent to [facility] instead

## 2025-08-05 ENCOUNTER — RESULTS FOLLOW-UP (OUTPATIENT)
Dept: FAMILY MEDICINE | Facility: CLINIC | Age: 50
End: 2025-08-05
Payer: COMMERCIAL

## 2025-08-05 NOTE — ASSESSMENT & PLAN NOTE
Albuterol inhaler prn with singulair 10 mg q evening. The current medical regimen is effective;  continue present plan and medications.

## 2025-08-05 NOTE — ASSESSMENT & PLAN NOTE
Latest Reference Range & Units 08/04/25 07:07   Cholesterol Total 100 - 199 mg/dL 135   HDL >39 mg/dL 42   LDL Calculated 0 - 99 mg/dL 69   Triglycerides 0 - 149 mg/dL 134   VLDL Cholesterol [place] 5 - 40 mg/dL 24   Continue rosuvastatin 20 mg qhs with low fat diet. Recheck cmp, flp in 6 months.

## 2025-08-05 NOTE — ASSESSMENT & PLAN NOTE
Losartan 25 mg daily for kidney protection with diabetes. Goal blood pressure <130/80. The current medical regimen is effective;  continue present plan and medications.

## 2025-08-06 ENCOUNTER — OFFICE VISIT (OUTPATIENT)
Dept: FAMILY MEDICINE | Facility: CLINIC | Age: 50
End: 2025-08-06
Payer: COMMERCIAL

## 2025-08-06 ENCOUNTER — TELEPHONE (OUTPATIENT)
Dept: FAMILY MEDICINE | Facility: CLINIC | Age: 50
End: 2025-08-06

## 2025-08-06 VITALS
DIASTOLIC BLOOD PRESSURE: 74 MMHG | WEIGHT: 246 LBS | HEIGHT: 67 IN | SYSTOLIC BLOOD PRESSURE: 124 MMHG | HEART RATE: 100 BPM | TEMPERATURE: 98 F | OXYGEN SATURATION: 99 % | BODY MASS INDEX: 38.61 KG/M2

## 2025-08-06 DIAGNOSIS — D72.829 LEUKOCYTOSIS, UNSPECIFIED TYPE: ICD-10-CM

## 2025-08-06 DIAGNOSIS — J45.20 MILD INTERMITTENT ASTHMA WITHOUT COMPLICATION: ICD-10-CM

## 2025-08-06 DIAGNOSIS — T78.40XD ALLERGY, SUBSEQUENT ENCOUNTER: ICD-10-CM

## 2025-08-06 DIAGNOSIS — R74.8 ELEVATED LIVER ENZYMES: ICD-10-CM

## 2025-08-06 DIAGNOSIS — Z23 IMMUNIZATION DUE: ICD-10-CM

## 2025-08-06 DIAGNOSIS — E11.65 TYPE 2 DIABETES MELLITUS WITH HYPERGLYCEMIA, WITHOUT LONG-TERM CURRENT USE OF INSULIN: ICD-10-CM

## 2025-08-06 DIAGNOSIS — I10 PRIMARY HYPERTENSION: ICD-10-CM

## 2025-08-06 DIAGNOSIS — J30.0 VASOMOTOR RHINITIS: ICD-10-CM

## 2025-08-06 DIAGNOSIS — E55.9 VITAMIN D DEFICIENCY: ICD-10-CM

## 2025-08-06 DIAGNOSIS — N39.41 URGE INCONTINENCE OF URINE: ICD-10-CM

## 2025-08-06 DIAGNOSIS — E78.5 HYPERLIPIDEMIA LDL GOAL <70: ICD-10-CM

## 2025-08-06 DIAGNOSIS — L70.0 ACNE VULGARIS: ICD-10-CM

## 2025-08-06 DIAGNOSIS — E11.65 TYPE 2 DIABETES MELLITUS WITH HYPERGLYCEMIA, WITHOUT LONG-TERM CURRENT USE OF INSULIN: Primary | ICD-10-CM

## 2025-08-06 PROBLEM — H00.019 STYE EXTERNAL: Status: RESOLVED | Noted: 2024-01-23 | Resolved: 2025-08-06

## 2025-08-06 PROBLEM — H60.92 EXTERNAL OTITIS OF LEFT EAR: Status: RESOLVED | Noted: 2024-04-08 | Resolved: 2025-08-06

## 2025-08-06 PROBLEM — J01.11 ACUTE RECURRENT FRONTAL SINUSITIS: Status: RESOLVED | Noted: 2024-10-09 | Resolved: 2025-08-06

## 2025-08-06 PROBLEM — J02.0 STREPTOCOCCAL PHARYNGITIS: Status: RESOLVED | Noted: 2023-03-30 | Resolved: 2025-08-06

## 2025-08-06 PROBLEM — J34.81: Status: RESOLVED | Noted: 2023-09-25 | Resolved: 2025-08-06

## 2025-08-06 PROBLEM — N30.00 ACUTE CYSTITIS WITHOUT HEMATURIA: Status: RESOLVED | Noted: 2023-09-25 | Resolved: 2025-08-06

## 2025-08-06 PROCEDURE — 3074F SYST BP LT 130 MM HG: CPT | Mod: CPTII,,, | Performed by: NURSE PRACTITIONER

## 2025-08-06 PROCEDURE — 3066F NEPHROPATHY DOC TX: CPT | Mod: CPTII,,, | Performed by: NURSE PRACTITIONER

## 2025-08-06 PROCEDURE — 1160F RVW MEDS BY RX/DR IN RCRD: CPT | Mod: CPTII,,, | Performed by: NURSE PRACTITIONER

## 2025-08-06 PROCEDURE — 3061F NEG MICROALBUMINURIA REV: CPT | Mod: CPTII,,, | Performed by: NURSE PRACTITIONER

## 2025-08-06 PROCEDURE — 3078F DIAST BP <80 MM HG: CPT | Mod: CPTII,,, | Performed by: NURSE PRACTITIONER

## 2025-08-06 PROCEDURE — 4010F ACE/ARB THERAPY RXD/TAKEN: CPT | Mod: CPTII,,, | Performed by: NURSE PRACTITIONER

## 2025-08-06 PROCEDURE — 3044F HG A1C LEVEL LT 7.0%: CPT | Mod: CPTII,,, | Performed by: NURSE PRACTITIONER

## 2025-08-06 PROCEDURE — 1159F MED LIST DOCD IN RCRD: CPT | Mod: CPTII,,, | Performed by: NURSE PRACTITIONER

## 2025-08-06 PROCEDURE — 99215 OFFICE O/P EST HI 40 MIN: CPT | Mod: ,,, | Performed by: NURSE PRACTITIONER

## 2025-08-06 PROCEDURE — 2023F DILAT RTA XM W/O RTNOPTHY: CPT | Mod: CPTII,,, | Performed by: NURSE PRACTITIONER

## 2025-08-06 PROCEDURE — 3008F BODY MASS INDEX DOCD: CPT | Mod: CPTII,,, | Performed by: NURSE PRACTITIONER

## 2025-08-06 RX ORDER — ROSUVASTATIN CALCIUM 10 MG/1
10 TABLET, COATED ORAL NIGHTLY
Qty: 90 TABLET | Refills: 0 | Status: SHIPPED | OUTPATIENT
Start: 2025-08-06 | End: 2025-08-06 | Stop reason: SDUPTHER

## 2025-08-06 RX ORDER — TIRZEPATIDE 15 MG/.5ML
15 INJECTION, SOLUTION SUBCUTANEOUS
Qty: 6 ML | Refills: 1 | Status: SHIPPED | OUTPATIENT
Start: 2025-08-06

## 2025-08-06 RX ORDER — ERGOCALCIFEROL 1.25 MG/1
50000 CAPSULE ORAL
Qty: 12 CAPSULE | Refills: 0 | Status: SHIPPED | OUTPATIENT
Start: 2025-08-06 | End: 2025-08-06 | Stop reason: SDUPTHER

## 2025-08-06 RX ORDER — LEVOCETIRIZINE DIHYDROCHLORIDE 5 MG/1
5 TABLET, FILM COATED ORAL NIGHTLY
Qty: 90 TABLET | Refills: 3 | Status: SHIPPED | OUTPATIENT
Start: 2025-08-06 | End: 2026-08-06

## 2025-08-06 RX ORDER — FLUTICASONE PROPIONATE 50 MCG
1 SPRAY, SUSPENSION (ML) NASAL 2 TIMES DAILY
Qty: 16 G | Refills: 3 | Status: SHIPPED | OUTPATIENT
Start: 2025-08-06 | End: 2025-08-06 | Stop reason: SDUPTHER

## 2025-08-06 RX ORDER — ALBUTEROL SULFATE 90 UG/1
INHALANT RESPIRATORY (INHALATION)
Qty: 6.7 G | Refills: 0 | Status: SHIPPED | OUTPATIENT
Start: 2025-08-06 | End: 2025-08-06 | Stop reason: SDUPTHER

## 2025-08-06 RX ORDER — ALBUTEROL SULFATE 90 UG/1
INHALANT RESPIRATORY (INHALATION)
Qty: 6.7 G | Refills: 3 | Status: SHIPPED | OUTPATIENT
Start: 2025-08-06

## 2025-08-06 RX ORDER — LEVOCETIRIZINE DIHYDROCHLORIDE 5 MG/1
5 TABLET, FILM COATED ORAL NIGHTLY
Qty: 30 TABLET | Refills: 11 | Status: SHIPPED | OUTPATIENT
Start: 2025-08-06 | End: 2025-08-06 | Stop reason: SDUPTHER

## 2025-08-06 RX ORDER — FLUTICASONE PROPIONATE 50 MCG
1 SPRAY, SUSPENSION (ML) NASAL 2 TIMES DAILY
Qty: 16 G | Refills: 3 | Status: SHIPPED | OUTPATIENT
Start: 2025-08-06

## 2025-08-06 RX ORDER — TIRZEPATIDE 15 MG/.5ML
15 INJECTION, SOLUTION SUBCUTANEOUS
Qty: 2 ML | Refills: 5 | Status: SHIPPED | OUTPATIENT
Start: 2025-08-06 | End: 2025-08-06 | Stop reason: SDUPTHER

## 2025-08-06 RX ORDER — ROSUVASTATIN CALCIUM 10 MG/1
10 TABLET, COATED ORAL NIGHTLY
Qty: 90 TABLET | Refills: 1 | Status: SHIPPED | OUTPATIENT
Start: 2025-08-06

## 2025-08-06 RX ORDER — MONTELUKAST SODIUM 10 MG/1
10 TABLET ORAL NIGHTLY
Qty: 90 TABLET | Refills: 1 | Status: SHIPPED | OUTPATIENT
Start: 2025-08-06

## 2025-08-06 RX ORDER — TRETINOIN 0.5 MG/G
CREAM TOPICAL NIGHTLY
Qty: 20 G | Refills: 1 | Status: SHIPPED | OUTPATIENT
Start: 2025-08-06

## 2025-08-06 RX ORDER — ERGOCALCIFEROL 1.25 MG/1
50000 CAPSULE ORAL
Qty: 12 CAPSULE | Refills: 1 | Status: SHIPPED | OUTPATIENT
Start: 2025-08-06 | End: 2026-02-02

## 2025-08-06 RX ORDER — MONTELUKAST SODIUM 10 MG/1
10 TABLET ORAL NIGHTLY
Qty: 90 TABLET | Refills: 1 | Status: SHIPPED | OUTPATIENT
Start: 2025-08-06 | End: 2025-08-06 | Stop reason: SDUPTHER

## 2025-08-06 NOTE — PATIENT INSTRUCTIONS
Sodium glucose complains of-transporter 2 inhibitors: Empagliflozin or dapagliflozin. To reduce the risk of sustained eGFR decline, end-stage kidney disease, cardiovascular death, and hospitalization in adults with chronic kidney disease at risk of progression. As an adjunct to diet and exercise to improve glycemic control in adults and pediatric patients >=10 years of age with type 2 diabetes mellitus; risk reduction of cardiovascular mortality in adults with type 2 diabetes mellitus and established cardiovascular disease.    home

## 2025-08-06 NOTE — ASSESSMENT & PLAN NOTE
Using richie sensor and remaining less <150 usually, no low blood sugar. Mounjaro 15 mg weekly. Add jardiance 10 mg or farxiga 5 mg for cardiac/renal protection. Continue current medications. Recheck renal panel in 3 months, may increase dose based upon response.   Hemoglobin A1c   Date Value Ref Range Status   08/04/2025 5.8 (H) 4.8 - 5.6 % Final     Comment:              Prediabetes: 5.7 - 6.4           Diabetes: >6.4           Glycemic control for adults with diabetes: <7.0     04/28/2025 5.8 (H) 4.8 - 5.6 % Final     Comment:              Prediabetes: 5.7 - 6.4           Diabetes: >6.4           Glycemic control for adults with diabetes: <7.0     01/13/2025 6.6 (H) 4.8 - 5.6 % Final     Comment:              Prediabetes: 5.7 - 6.4           Diabetes: >6.4           Glycemic control for adults with diabetes: <7.0     05/03/2022 7.2 4.8 - 5.6    11/30/2021 6.6 (H) 4.8 - 5.6 % Final   07/12/2021 6.3 (H) 4.8 - 5.6 % Final

## 2025-08-06 NOTE — PROGRESS NOTES
Patient ID: Sagrario Waller  : 1975     Chief Complaint: Follow-up (3 month follow up. Not feeling her best. tired)    Allergies: Patient is allergic to sulfa (sulfonamide antibiotics).     History of Present Illness:  The patient is a 50 y.o. White female who presents to clinic for evaluation and management with a chief complaint of Follow-up (3 month follow up. Not feeling her best. tired)   HPI   The patient returns for discussion of recent lab results and disease management. She is taking her medication as prescribed and denies experiencing side effects. Feeling more fatigue in past week without fever. Sugar and blood pressure stable. Increased sinus drip but increased urinary incontinence.     Past Medical History:  has a past medical history of ADHD (attention deficit hyperactivity disorder), Anxiety, Arthritis, Asthma, COPD (chronic obstructive pulmonary disease), Depression, Diabetes mellitus, type 2, GERD (gastroesophageal reflux disease), Hypertension, LYNSEY (obstructive sleep apnea), and Urinary incontinence.    Social History:  reports that she quit smoking about 26 years ago. Her smoking use included cigarettes. She started smoking about 36 years ago. She has a 10.2 pack-year smoking history. She has never been exposed to tobacco smoke. She has never used smokeless tobacco. She reports that she does not currently use alcohol. She reports that she does not use drugs.    Care Team: Patient Care Team:  Genesis Lopez DNP as PCP - General (Family Medicine)     Current Medications:  Current Outpatient Medications   Medication Instructions    albuterol (PROVENTIL/VENTOLIN HFA) 90 mcg/actuation inhaler INHALE TWO(2) PUFFS INTO THE LUNGS 4 TIMES DAILY AS NEEDED FOR WHEEZING /SHAKE WELL    ALPRAZolam (XANAX) 2 mg, 2 times daily    azelastine (ASTELIN) 137 mcg (0.1 %) nasal spray SPRAY ONE(1) SPRAY IN EACH NOSTRIL TWICE DAILY FOR SINUS, ALLERGY, &/OR CONGESTION    BELSOMRA 20 mg Tab 1 tablet, Nightly PRN  "   buPROPion (WELLBUTRIN XL) 300 mg, Every morning    clindamycin (CLEOCIN T) 1 % external solution Nightly    cyanocobalamin 1,000 mcg, Subcutaneous, Every 14 days    dextroamphetamine-amphetamine 30 mg Tab 1 tablet, 2 times daily    doxepin (SINEQUAN) 100 mg    ergocalciferol (ERGOCALCIFEROL) 50,000 Units, Oral, Every 7 days    flash glucose sensor (FREESTYLE MAHAD 14 DAY SENSOR) Kit CHECK BLOOD SUGAR AS DIRECTED    fluticasone propionate (FLONASE) 50 mcg, Each Nostril, 2 times daily    hydrocortisone 2.5 % ointment 2 times daily    lactulose (CHRONULAC) 10 g, Oral, Daily PRN, Can increase to 30 mL daily if needed.    levocetirizine (XYZAL) 5 mg, Oral, Nightly    losartan (COZAAR) 25 mg, Oral, Nightly    montelukast (SINGULAIR) 10 mg, Oral, Nightly    MOUNJARO 15 mg, Subcutaneous, Every 7 days    naloxone (NARCAN) 4 mg/actuation Spry     OXcarbazepine (TRILEPTAL) 300 mg, 2 times daily    rosuvastatin (CRESTOR) 10 mg, Oral, Nightly, Patient will call when she needs a refill    syringe with needle, safety (EASY TOUCH FLIPLOCK SYRINGE) 3 mL 23 gauge x 1" Syrg 1 each, Subcutaneous, Every 14 days    TRELEGY ELLIPTA 200-62.5-25 mcg inhaler INHALE ONE(1) PUFF INTO THE LUNGS ONCE A DAY AT THE SAME TIME EACH DAY FOR BREATHING /RINSE MOUTH AFTER USE    tretinoin (RETIN-A) 0.05 % cream Topical (Top), Nightly    valACYclovir (VALTREX) 1,000 mg, Oral, 3 times daily       Review of Systems   Constitutional:  Positive for fatigue. Negative for chills and fever.   HENT:  Positive for postnasal drip.    Respiratory:  Negative for shortness of breath and wheezing.    Cardiovascular:  Positive for palpitations and leg swelling.   Gastrointestinal:  Positive for constipation.   Genitourinary:  Positive for bladder incontinence, frequency and urgency. Negative for decreased urine volume and dysuria.   Musculoskeletal:  Positive for arthralgias and joint swelling (right knees and ankle/feet worse than left, brace helps). " "  Allergic/Immunologic: Positive for environmental allergies.   Psychiatric/Behavioral:  Positive for sleep disturbance. Negative for decreased concentration, depressed mood and suicidal ideas. The patient is not nervous/anxious.    All other systems reviewed and are negative.       Visit Vitals  /74 (BP Location: Left arm, Patient Position: Sitting)   Pulse 100   Temp 97.7 °F (36.5 °C)   Ht 5' 7" (1.702 m)   Wt 111.6 kg (246 lb)   SpO2 99%   BMI 38.53 kg/m²       Physical Exam  Vitals and nursing note reviewed.   Constitutional:       General: She is not in acute distress.     Appearance: She is obese. She is not ill-appearing.   HENT:      Head: Normocephalic and atraumatic.      Right Ear: Tympanic membrane normal.      Left Ear: Tympanic membrane normal.      Nose: Rhinorrhea present.      Mouth/Throat:      Mouth: Mucous membranes are moist.      Pharynx: Oropharynx is clear.   Eyes:      General: No scleral icterus.     Extraocular Movements: Extraocular movements intact.      Conjunctiva/sclera: Conjunctivae normal.      Pupils: Pupils are equal, round, and reactive to light.   Neck:      Vascular: No carotid bruit.   Cardiovascular:      Rate and Rhythm: Normal rate and regular rhythm.      Pulses: Normal pulses.           Dorsalis pedis pulses are 2+ on the right side and 2+ on the left side.        Posterior tibial pulses are 2+ on the right side and 2+ on the left side.      Heart sounds: Normal heart sounds. No murmur heard.     No friction rub. No gallop.   Pulmonary:      Effort: Pulmonary effort is normal. No respiratory distress.      Breath sounds: Normal breath sounds. No wheezing, rhonchi or rales.   Abdominal:      General: Abdomen is flat. Bowel sounds are normal. There is no distension.      Palpations: Abdomen is soft. There is no mass.      Tenderness: There is no abdominal tenderness.   Musculoskeletal:         General: Normal range of motion.      Cervical back: Normal range of motion " and neck supple.      Right knee: Crepitus present. No tenderness.      Left knee: Crepitus present. No tenderness.      Right ankle: Swelling present. No tenderness. Normal pulse.      Right Achilles Tendon: No tenderness.      Left ankle: Swelling present. No tenderness. Normal pulse.      Left Achilles Tendon: No tenderness.        Legs:    Feet:      Right foot:      Protective Sensation: 10 sites tested.  10 sites sensed.      Skin integrity: Skin integrity normal.      Toenail Condition: Right toenails are normal.      Left foot:      Protective Sensation: 10 sites tested.  10 sites sensed.      Skin integrity: Skin integrity normal.      Toenail Condition: Left toenails are normal.   Skin:     General: Skin is warm and dry.   Neurological:      General: No focal deficit present.      Mental Status: She is alert and oriented to person, place, and time.   Psychiatric:         Mood and Affect: Mood normal.         Behavior: Behavior normal.          Labs Reviewed:  Chemistry:  Lab Results   Component Value Date     08/04/2025    K 4.0 08/04/2025    BUN 7 08/04/2025    CREATININE 0.73 08/04/2025    EGFRNORACEVR 99 08/04/2025    CALCIUM 9.5 08/04/2025    ALKPHOS 94 11/30/2021    ALBUMIN 4.3 08/04/2025    AST 70 (H) 08/04/2025    ALT 56 (H) 08/04/2025    MG 2.0 01/08/2024    OBJPSPIJ96BA 35.4 08/04/2025    TSH 3.030 10/07/2024        Lab Results   Component Value Date    HGBA1C 5.8 (H) 08/04/2025        Hematology:  Lab Results   Component Value Date    WBC 14.2 (H) 08/04/2025    RBC 4.97 08/04/2025    HGB 15.1 08/04/2025    HCT 44.7 08/04/2025    MCV 90 08/04/2025    MCH 30.4 08/04/2025    MCHC 33.8 08/04/2025    RDW 12.4 08/04/2025     08/04/2025    MONO 8 08/04/2025    MONO 1.2 (H) 08/04/2025    BASO 0.1 08/04/2025    EOSINOPHIL 2 08/04/2025    BASOPHIL 1 08/04/2025       Lipid Panel:  Lab Results   Component Value Date    CHOL 135 08/04/2025    HDL 42 08/04/2025    LDLCALC 69 08/04/2025    TRIG 134  08/04/2025    TOTALCHOLEST 3.9 05/03/2022        Assessment & Plan:  1. Type 2 diabetes mellitus with hyperglycemia, without long-term current use of insulin  Overview:  Lost 15# since starting Mounjaro, increase to 10 mg if doing well in one month can change to 3 months supply. Will notify with results of lab draw when available. Continue current treatment until results available.       Assessment & Plan:  Using richie sensor and remaining less <150 usually, no low blood sugar. Mounjaro 15 mg weekly. Add jardiance 10 mg or farxiga 5 mg for cardiac/renal protection. Continue current medications. Recheck renal panel in 3 months, may increase dose based upon response.   Hemoglobin A1c   Date Value Ref Range Status   08/04/2025 5.8 (H) 4.8 - 5.6 % Final     Comment:              Prediabetes: 5.7 - 6.4           Diabetes: >6.4           Glycemic control for adults with diabetes: <7.0     04/28/2025 5.8 (H) 4.8 - 5.6 % Final     Comment:              Prediabetes: 5.7 - 6.4           Diabetes: >6.4           Glycemic control for adults with diabetes: <7.0     01/13/2025 6.6 (H) 4.8 - 5.6 % Final     Comment:              Prediabetes: 5.7 - 6.4           Diabetes: >6.4           Glycemic control for adults with diabetes: <7.0     05/03/2022 7.2 4.8 - 5.6    11/30/2021 6.6 (H) 4.8 - 5.6 % Final   07/12/2021 6.3 (H) 4.8 - 5.6 % Final        Orders:  -     tirzepatide (MOUNJARO) 15 mg/0.5 mL PnIj; Inject 15 mg into the skin every 7 days.  Dispense: 2 mL; Refill: 5    2. Hyperlipidemia LDL goal <70  Overview:  In October 2024 had increased rosuvastatin to 20 mg daily with low dairy low-fat diet LDL continues    Assessment & Plan:   Latest Reference Range & Units 08/04/25 07:07   Cholesterol Total 100 - 199 mg/dL 135   HDL >39 mg/dL 42   LDL Calculated 0 - 99 mg/dL 69   Triglycerides 0 - 149 mg/dL 134   VLDL Cholesterol [place] 5 - 40 mg/dL 24   Continue rosuvastatin 20 mg qhs with low fat diet. Recheck cmp, flp in 6 months.      Orders:  -     rosuvastatin (CRESTOR) 10 MG tablet; Take 1 tablet (10 mg total) by mouth every evening. Patient will call when she needs a refill  Dispense: 90 tablet; Refill: 0    3. Leukocytosis, unspecified type  Assessment & Plan:  No sign of infection, fever URI or UTI symptoms. Recheck cbc in 3 months.       4. Elevated liver enzymes  Assessment & Plan:  Slight rise in liver enzymes. No fever or GI upset. Follow low fat/bland diet and notify any right upper quadrant pain, fever, jaundice, GI distress. Recheck liver enzymes in 6 months.       5. Primary hypertension  Assessment & Plan:  Losartan 25 mg daily for kidney protection with diabetes. Goal blood pressure <130/80. The current medical regimen is effective;  continue present plan and medications.        6. Mild intermittent asthma without complication  Assessment & Plan:  Albuterol inhaler prn with singulair 10 mg q evening. The current medical regimen is effective;  continue present plan and medications.      Orders:  -     albuterol (PROVENTIL/VENTOLIN HFA) 90 mcg/actuation inhaler; INHALE TWO(2) PUFFS INTO THE LUNGS 4 TIMES DAILY AS NEEDED FOR WHEEZING /SHAKE WELL  Dispense: 6.7 g; Refill: 0    7. Immunization due  Assessment & Plan:  Pneumococcal immunization due. Recommend immunization to prevent complications possible with disease process. Discussed risks versus benefits of immunization.       8. Vasomotor rhinitis  Assessment & Plan:  Zyrtec 10 singulair 10 mg at night  astelin and flonase nasal spray as needed. The current medical regimen is effective;  continue present plan and medications.      Orders:  -     levocetirizine (XYZAL) 5 MG tablet; Take 1 tablet (5 mg total) by mouth every evening.  Dispense: 30 tablet; Refill: 11  -     fluticasone propionate (FLONASE) 50 mcg/actuation nasal spray; 1 spray (50 mcg total) by Each Nostril route 2 (two) times daily.  Dispense: 16 g; Refill: 3    9. Acne vulgaris  -     tretinoin (RETIN-A) 0.05 %  cream; Apply topically nightly.  Dispense: 20 g; Refill: 1    10. Urge incontinence of urine    11. Allergy, subsequent encounter  -     montelukast (SINGULAIR) 10 mg tablet; Take 1 tablet (10 mg total) by mouth every evening.  Dispense: 90 tablet; Refill: 1    12. Vitamin D deficiency  -     ergocalciferol (ERGOCALCIFEROL) 50,000 unit Cap; Take 1 capsule (50,000 Units total) by mouth every 7 days.  Dispense: 12 capsule; Refill: 0         Future Appointments   Date Time Provider Department Center   11/3/2025  7:30 AM NURSE, [facility]   2/2/2026  7:10 AM NURSE, [facility]   4/1/2026  8:40 AM Surekha Villarreal MD [facility]       Follow up in about 3 months (around 11/6/2025). Call sooner if needed.    Genesis Lopez DNP    Lab Frequency Next Occurrence   Ambulatory referral/consult to ENT Once 10/16/2024

## 2025-08-06 NOTE — ASSESSMENT & PLAN NOTE
Zyrtec 10 singulair 10 mg at night  astelin and flonase nasal spray as needed. The current medical regimen is effective;  continue present plan and medications.

## 2025-08-06 NOTE — TELEPHONE ENCOUNTER
----- Message from [place] sent at 8/6/2025  3:12 PM CDT -----  Patient's medicine was sent to [facility] instead of [facility].  Please send all medicine EXCEPT  TRETINOIN [facility]

## 2025-08-06 NOTE — ASSESSMENT & PLAN NOTE
Slight rise in liver enzymes. No fever or GI upset. Follow low fat/bland diet and notify any right upper quadrant pain, fever, jaundice, GI distress. Recheck liver enzymes in 6 months.

## 2025-08-06 NOTE — ASSESSMENT & PLAN NOTE
Pneumococcal immunization due. Recommend immunization to prevent complications possible with disease process. Discussed risks versus benefits of immunization.    Bilateral UE WFL/Grossly Intact

## 2025-08-09 LAB
BACTERIA UR CULT: ABNORMAL
OTHER ANTIBIOTIC SUSC ISLT: ABNORMAL

## 2025-08-11 ENCOUNTER — TELEPHONE (OUTPATIENT)
Dept: FAMILY MEDICINE | Facility: CLINIC | Age: 50
End: 2025-08-11
Payer: COMMERCIAL

## 2025-08-11 DIAGNOSIS — R30.0 DYSURIA: Primary | ICD-10-CM

## 2025-08-11 RX ORDER — NITROFURANTOIN 25; 75 MG/1; MG/1
100 CAPSULE ORAL 2 TIMES DAILY
Qty: 14 CAPSULE | Refills: 0 | Status: SHIPPED | OUTPATIENT
Start: 2025-08-11 | End: 2025-08-18